# Patient Record
Sex: FEMALE | Race: WHITE | Employment: OTHER | URBAN - METROPOLITAN AREA
[De-identification: names, ages, dates, MRNs, and addresses within clinical notes are randomized per-mention and may not be internally consistent; named-entity substitution may affect disease eponyms.]

---

## 2022-01-26 DIAGNOSIS — G89.18 POST-OP PAIN: Primary | ICD-10-CM

## 2022-01-26 RX ORDER — ONDANSETRON 4 MG/1
4 TABLET, FILM COATED ORAL EVERY 8 HOURS PRN
Qty: 21 TABLET | Refills: 0 | Status: SHIPPED | OUTPATIENT
Start: 2022-01-26 | End: 2022-02-02

## 2022-01-26 RX ORDER — HYDROCODONE BITARTRATE AND ACETAMINOPHEN 5; 325 MG/1; MG/1
1 TABLET ORAL EVERY 6 HOURS PRN
Qty: 21 TABLET | Refills: 0 | Status: SHIPPED | OUTPATIENT
Start: 2022-01-26 | End: 2022-01-31

## 2023-11-22 ENCOUNTER — HOSPITAL ENCOUNTER (EMERGENCY)
Age: 73
Discharge: HOME | End: 2023-11-22
Attending: EMERGENCY MEDICINE
Payer: MEDICARE

## 2023-11-22 ENCOUNTER — APPOINTMENT (OUTPATIENT)
Dept: GENERAL RADIOLOGY | Age: 73
End: 2023-11-22
Payer: MEDICARE

## 2023-11-22 VITALS
SYSTOLIC BLOOD PRESSURE: 133 MMHG | DIASTOLIC BLOOD PRESSURE: 85 MMHG | RESPIRATION RATE: 20 BRPM | HEART RATE: 82 BPM | OXYGEN SATURATION: 100 % | TEMPERATURE: 98.4 F

## 2023-11-22 DIAGNOSIS — S63.501A SPRAIN OF RIGHT WRIST, INITIAL ENCOUNTER: Primary | ICD-10-CM

## 2023-11-22 PROCEDURE — 73110 X-RAY EXAM OF WRIST: CPT

## 2023-11-22 PROCEDURE — 99283 EMERGENCY DEPT VISIT LOW MDM: CPT

## 2023-11-22 RX ORDER — ASPIRIN 81 MG/1
81 TABLET, CHEWABLE ORAL DAILY
COMMUNITY
Start: 2022-01-27

## 2023-11-22 RX ORDER — NIFEDIPINE 90 MG/1
90 TABLET, EXTENDED RELEASE ORAL DAILY
COMMUNITY
Start: 2023-10-26

## 2023-11-22 RX ORDER — NAPROXEN 500 MG/1
500 TABLET ORAL 2 TIMES DAILY WITH MEALS
Qty: 20 TABLET | Refills: 0 | Status: SHIPPED | OUTPATIENT
Start: 2023-11-22

## 2023-11-22 NOTE — ED PROVIDER NOTES
[facility]  eMERGENCY dEPARTMENTeNCOUnter      Pt Name: Ruth Madrid  MRN: [id number]  [address] [facility] 1950  Date ofevaluation: 11/22/2023  Provider: Mahsa Suresh, [address] COMPLAINT       Chief Complaint   Patient presents with    Wrist Injury     Angel Oconnell last week. Right wrist. Slightly swollen, PMS intact         HISTORY OF PRESENT ILLNESS  (Location/Symptom, Timing/Onset, Context/Setting, Quality, Duration, Modifying Factors, Severity.)   Ruth Madrid is a 68 y.o. female who presents to the emergency department with right wrist pain and swelling status post injury occurred roughly a week ago when patient tripped and fell using her right arm to catch herself. Pain worse with movement relieved with rest.      Nursing Notes were reviewed. ALLERGIES     Patient has no known allergies. CURRENT MEDICATIONS       Discharge Medication List as of 11/22/2023 10:12 AM        CONTINUE these medications which have NOT CHANGED    Details   NIFEdipine (PROCARDIA XL) 90 MG extended release tablet Take 1 tablet by mouth dailyHistorical Med      dapagliflozin (FARXIGA) 10 MG tablet Take 1 tablet by mouth dailyHistorical Med      aspirin 81 MG chewable tablet Take 1 tablet by mouth dailyHistorical Med      hydrochlorothiazide (HYDRODIURIL) 25 MG tablet Take 25 mg by mouth daily      escitalopram (LEXAPRO) 10 MG tablet Take 10 mg by mouth daily      LORazepam (ATIVAN) 0.5 MG tablet Take 0.5 mg by mouth 2 times daily as needed for Anxiety      metoprolol succinate (TOPROL XL) 100 MG extended release tablet Take 100 mg by mouth daily      cloNIDine (CATAPRES) 0.1 MG tablet Take 0.1 mg by mouth 2 times daily             PAST MEDICAL HISTORY         Diagnosis Date    Arthritis     Brain aneurysm 4157,[id number]    Hypertension        SURGICAL HISTORY           Procedure Laterality Date    BRAIN SURGERY      clip and coil     JOINT REPLACEMENT      TUBAL LIGATION           HISTORY     History reviewed.  No pertinent

## 2023-11-22 NOTE — ED PROVIDER NOTES
eMERGENCY dEPARTMENT eNCOUnter   Independent Attestation     Pt Name: Jacobo Marin  MRN: [id number]  [address] [facility] 1950  Date of evaluation: 11/22/23     Jacobo Marin is a 68 y.o. female with CC: Wrist Injury Nickola Pals last week. Right wrist. Slightly swollen, PMS intact)        This visit was performed by both a physician and an APC. I performed all aspects of the MDM as documented.       Kelby Torrez MD  Attending Emergency Physician            Kelby Torrez MD  37/02/35 7090

## 2024-04-20 ENCOUNTER — APPOINTMENT (OUTPATIENT)
Dept: GENERAL RADIOLOGY | Age: 74
DRG: 690 | End: 2024-04-20
Payer: MEDICARE

## 2024-04-20 ENCOUNTER — HOSPITAL ENCOUNTER (INPATIENT)
Age: 74
LOS: 5 days | Discharge: HOME HEALTH | DRG: 690 | End: 2024-04-25
Attending: EMERGENCY MEDICINE | Admitting: FAMILY MEDICINE
Payer: MEDICARE

## 2024-04-20 DIAGNOSIS — I51.3 LV (LEFT VENTRICULAR) MURAL THROMBUS: ICD-10-CM

## 2024-04-20 DIAGNOSIS — I95.1 ORTHOSTATIC HYPOTENSION: ICD-10-CM

## 2024-04-20 DIAGNOSIS — N10 ACUTE PYELONEPHRITIS: Primary | ICD-10-CM

## 2024-04-20 DIAGNOSIS — M54.50 MIDLINE LOW BACK PAIN WITHOUT SCIATICA, UNSPECIFIED CHRONICITY: ICD-10-CM

## 2024-04-20 DIAGNOSIS — R42 LIGHT HEADEDNESS: ICD-10-CM

## 2024-04-20 DIAGNOSIS — R07.9 CHEST PAIN, UNSPECIFIED TYPE: ICD-10-CM

## 2024-04-20 DIAGNOSIS — R31.0 GROSS HEMATURIA: ICD-10-CM

## 2024-04-20 PROBLEM — I50.20 HEART FAILURE WITH REDUCED EJECTION FRACTION (HCC): Status: ACTIVE | Noted: 2023-03-14

## 2024-04-20 PROBLEM — K21.9 GERD (GASTROESOPHAGEAL REFLUX DISEASE): Status: ACTIVE | Noted: 2023-08-08

## 2024-04-20 PROBLEM — E78.5 DYSLIPIDEMIA: Status: ACTIVE | Noted: 2019-08-12

## 2024-04-20 PROBLEM — N30.00 ACUTE CYSTITIS WITHOUT HEMATURIA: Status: ACTIVE | Noted: 2024-04-20

## 2024-04-20 PROBLEM — I63.9 CEREBROVASCULAR ACCIDENT (CVA) (HCC): Status: ACTIVE | Noted: 2019-07-09

## 2024-04-20 PROBLEM — I25.10 CORONARY ARTERY DISEASE INVOLVING NATIVE CORONARY ARTERY OF NATIVE HEART WITHOUT ANGINA PECTORIS: Status: ACTIVE | Noted: 2019-11-07

## 2024-04-20 PROBLEM — I10 ESSENTIAL HYPERTENSION: Status: ACTIVE | Noted: 2019-07-09

## 2024-04-20 LAB
ALBUMIN SERPL-MCNC: 3.8 G/DL (ref 3.5–5.2)
ALP SERPL-CCNC: 66 U/L (ref 35–104)
ALT SERPL-CCNC: 13 U/L (ref 5–33)
ANION GAP SERPL CALCULATED.3IONS-SCNC: 11 MMOL/L (ref 9–17)
AST SERPL-CCNC: 16 U/L
BACTERIA URNS QL MICRO: ABNORMAL
BASOPHILS # BLD: <0.03 K/UL (ref 0–0.2)
BASOPHILS NFR BLD: 0 % (ref 0–2)
BILIRUB SERPL-MCNC: 0.5 MG/DL (ref 0.3–1.2)
BILIRUB UR QL STRIP: NEGATIVE
BNP SERPL-MCNC: 1942 PG/ML
BUN SERPL-MCNC: 40 MG/DL (ref 8–23)
BUN/CREAT SERPL: 33 (ref 9–20)
CALCIUM SERPL-MCNC: 9.1 MG/DL (ref 8.6–10.4)
CHLORIDE SERPL-SCNC: 101 MMOL/L (ref 98–107)
CLARITY UR: ABNORMAL
CO2 SERPL-SCNC: 22 MMOL/L (ref 20–31)
COLOR UR: YELLOW
CREAT SERPL-MCNC: 1.2 MG/DL (ref 0.5–0.9)
EKG ATRIAL RATE: 73 BPM
EKG P AXIS: 74 DEGREES
EKG P-R INTERVAL: 204 MS
EKG Q-T INTERVAL: 420 MS
EKG QRS DURATION: 82 MS
EKG QTC CALCULATION (BAZETT): 462 MS
EKG R AXIS: -18 DEGREES
EKG T AXIS: 100 DEGREES
EKG VENTRICULAR RATE: 73 BPM
EOSINOPHIL # BLD: <0.03 K/UL (ref 0–0.44)
EOSINOPHILS RELATIVE PERCENT: 0 % (ref 1–4)
EPI CELLS #/AREA URNS HPF: ABNORMAL /HPF (ref 0–5)
ERYTHROCYTE [DISTWIDTH] IN BLOOD BY AUTOMATED COUNT: 12.5 % (ref 11.8–14.4)
GFR SERPL CREATININE-BSD FRML MDRD: 48 ML/MIN/1.73M2
GLUCOSE SERPL-MCNC: 97 MG/DL (ref 70–99)
GLUCOSE UR STRIP-MCNC: ABNORMAL MG/DL
HCT VFR BLD AUTO: 36.8 % (ref 36.3–47.1)
HCT VFR BLD AUTO: 37.8 % (ref 36.3–47.1)
HGB BLD-MCNC: 11.8 G/DL (ref 11.9–15.1)
HGB BLD-MCNC: 12.3 G/DL (ref 11.9–15.1)
HGB UR QL STRIP.AUTO: ABNORMAL
IMM GRANULOCYTES # BLD AUTO: 0.06 K/UL (ref 0–0.3)
IMM GRANULOCYTES NFR BLD: 0 %
KETONES UR STRIP-MCNC: NEGATIVE MG/DL
LACTATE BLDV-SCNC: 1.3 MMOL/L (ref 0.5–2.2)
LEUKOCYTE ESTERASE UR QL STRIP: ABNORMAL
LIPASE SERPL-CCNC: 60 U/L (ref 13–60)
LYMPHOCYTES NFR BLD: 0.7 K/UL (ref 1.1–3.7)
LYMPHOCYTES RELATIVE PERCENT: 5 % (ref 24–43)
MCH RBC QN AUTO: 28.6 PG (ref 25.2–33.5)
MCHC RBC AUTO-ENTMCNC: 32.1 G/DL (ref 28.4–34.8)
MCV RBC AUTO: 89.1 FL (ref 82.6–102.9)
MONOCYTES NFR BLD: 0.39 K/UL (ref 0.1–1.2)
MONOCYTES NFR BLD: 3 % (ref 3–12)
NEUTROPHILS NFR BLD: 92 % (ref 36–65)
NEUTS SEG NFR BLD: 13.9 K/UL (ref 1.5–8.1)
NITRITE UR QL STRIP: NEGATIVE
NRBC BLD-RTO: 0 PER 100 WBC
PH UR STRIP: 7 [PH] (ref 5–8)
PLATELET # BLD AUTO: 298 K/UL (ref 138–453)
PMV BLD AUTO: 8.5 FL (ref 8.1–13.5)
POTASSIUM SERPL-SCNC: 4.4 MMOL/L (ref 3.7–5.3)
PROT SERPL-MCNC: 6.9 G/DL (ref 6.4–8.3)
PROT UR STRIP-MCNC: ABNORMAL MG/DL
RBC # BLD AUTO: 4.13 M/UL (ref 3.95–5.11)
RBC #/AREA URNS HPF: ABNORMAL /HPF (ref 0–2)
SODIUM SERPL-SCNC: 134 MMOL/L (ref 135–144)
SP GR UR STRIP: 1.01 (ref 1–1.03)
TROPONIN I SERPL HS-MCNC: 24 NG/L (ref 0–14)
TROPONIN I SERPL HS-MCNC: 28 NG/L (ref 0–14)
UROBILINOGEN UR STRIP-ACNC: NORMAL EU/DL (ref 0–1)
WBC #/AREA URNS HPF: ABNORMAL /HPF (ref 0–5)
WBC OTHER # BLD: 15.1 K/UL (ref 3.5–11.3)

## 2024-04-20 PROCEDURE — 36415 COLL VENOUS BLD VENIPUNCTURE: CPT

## 2024-04-20 PROCEDURE — 6360000002 HC RX W HCPCS: Performed by: NURSE PRACTITIONER

## 2024-04-20 PROCEDURE — 6370000000 HC RX 637 (ALT 250 FOR IP): Performed by: NURSE PRACTITIONER

## 2024-04-20 PROCEDURE — 85025 COMPLETE CBC W/AUTO DIFF WBC: CPT

## 2024-04-20 PROCEDURE — 99222 1ST HOSP IP/OBS MODERATE 55: CPT | Performed by: NURSE PRACTITIONER

## 2024-04-20 PROCEDURE — 80053 COMPREHEN METABOLIC PANEL: CPT

## 2024-04-20 PROCEDURE — 99285 EMERGENCY DEPT VISIT HI MDM: CPT

## 2024-04-20 PROCEDURE — 84484 ASSAY OF TROPONIN QUANT: CPT

## 2024-04-20 PROCEDURE — 83605 ASSAY OF LACTIC ACID: CPT

## 2024-04-20 PROCEDURE — 87086 URINE CULTURE/COLONY COUNT: CPT

## 2024-04-20 PROCEDURE — 93005 ELECTROCARDIOGRAM TRACING: CPT

## 2024-04-20 PROCEDURE — 6360000002 HC RX W HCPCS

## 2024-04-20 PROCEDURE — 87088 URINE BACTERIA CULTURE: CPT

## 2024-04-20 PROCEDURE — 96374 THER/PROPH/DIAG INJ IV PUSH: CPT

## 2024-04-20 PROCEDURE — 1200000000 HC SEMI PRIVATE

## 2024-04-20 PROCEDURE — 2580000003 HC RX 258: Performed by: NURSE PRACTITIONER

## 2024-04-20 PROCEDURE — 83880 ASSAY OF NATRIURETIC PEPTIDE: CPT

## 2024-04-20 PROCEDURE — 83690 ASSAY OF LIPASE: CPT

## 2024-04-20 PROCEDURE — 85018 HEMOGLOBIN: CPT

## 2024-04-20 PROCEDURE — 2500000003 HC RX 250 WO HCPCS: Performed by: NURSE PRACTITIONER

## 2024-04-20 PROCEDURE — 85014 HEMATOCRIT: CPT

## 2024-04-20 PROCEDURE — 87186 SC STD MICRODIL/AGAR DIL: CPT

## 2024-04-20 PROCEDURE — 2580000003 HC RX 258

## 2024-04-20 PROCEDURE — 96375 TX/PRO/DX INJ NEW DRUG ADDON: CPT

## 2024-04-20 PROCEDURE — 71045 X-RAY EXAM CHEST 1 VIEW: CPT

## 2024-04-20 PROCEDURE — 81001 URINALYSIS AUTO W/SCOPE: CPT

## 2024-04-20 PROCEDURE — 6370000000 HC RX 637 (ALT 250 FOR IP): Performed by: FAMILY MEDICINE

## 2024-04-20 RX ORDER — ASPIRIN 81 MG/1
81 TABLET, CHEWABLE ORAL DAILY
Status: DISCONTINUED | OUTPATIENT
Start: 2024-04-20 | End: 2024-04-25 | Stop reason: HOSPADM

## 2024-04-20 RX ORDER — ONDANSETRON 2 MG/ML
4 INJECTION INTRAMUSCULAR; INTRAVENOUS EVERY 6 HOURS PRN
Status: DISCONTINUED | OUTPATIENT
Start: 2024-04-20 | End: 2024-04-25 | Stop reason: HOSPADM

## 2024-04-20 RX ORDER — ONDANSETRON 2 MG/ML
4 INJECTION INTRAMUSCULAR; INTRAVENOUS ONCE
Status: COMPLETED | OUTPATIENT
Start: 2024-04-20 | End: 2024-04-20

## 2024-04-20 RX ORDER — FUROSEMIDE 10 MG/ML
40 INJECTION INTRAMUSCULAR; INTRAVENOUS ONCE
Status: COMPLETED | OUTPATIENT
Start: 2024-04-20 | End: 2024-04-20

## 2024-04-20 RX ORDER — PANTOPRAZOLE SODIUM 40 MG/1
40 TABLET, DELAYED RELEASE ORAL DAILY
COMMUNITY
End: 2024-04-20

## 2024-04-20 RX ORDER — SODIUM CHLORIDE 0.9 % (FLUSH) 0.9 %
10 SYRINGE (ML) INJECTION PRN
Status: DISCONTINUED | OUTPATIENT
Start: 2024-04-20 | End: 2024-04-25 | Stop reason: HOSPADM

## 2024-04-20 RX ORDER — SODIUM CHLORIDE 0.9 % (FLUSH) 0.9 %
5-40 SYRINGE (ML) INJECTION EVERY 12 HOURS SCHEDULED
Status: DISCONTINUED | OUTPATIENT
Start: 2024-04-20 | End: 2024-04-25 | Stop reason: HOSPADM

## 2024-04-20 RX ORDER — ATORVASTATIN CALCIUM 80 MG/1
80 TABLET, FILM COATED ORAL DAILY
Status: DISCONTINUED | OUTPATIENT
Start: 2024-04-20 | End: 2024-04-25 | Stop reason: HOSPADM

## 2024-04-20 RX ORDER — POTASSIUM CHLORIDE 20 MEQ/1
40 TABLET, EXTENDED RELEASE ORAL PRN
Status: DISCONTINUED | OUTPATIENT
Start: 2024-04-20 | End: 2024-04-25 | Stop reason: HOSPADM

## 2024-04-20 RX ORDER — POLYETHYLENE GLYCOL 3350 17 G/17G
17 POWDER, FOR SOLUTION ORAL DAILY PRN
Status: DISCONTINUED | OUTPATIENT
Start: 2024-04-20 | End: 2024-04-25 | Stop reason: HOSPADM

## 2024-04-20 RX ORDER — 0.9 % SODIUM CHLORIDE 0.9 %
1000 INTRAVENOUS SOLUTION INTRAVENOUS ONCE
Status: COMPLETED | OUTPATIENT
Start: 2024-04-20 | End: 2024-04-20

## 2024-04-20 RX ORDER — SODIUM CHLORIDE 9 MG/ML
INJECTION, SOLUTION INTRAVENOUS CONTINUOUS
Status: ACTIVE | OUTPATIENT
Start: 2024-04-20 | End: 2024-04-22

## 2024-04-20 RX ORDER — FENTANYL CITRATE 0.05 MG/ML
50 INJECTION, SOLUTION INTRAMUSCULAR; INTRAVENOUS ONCE
Status: COMPLETED | OUTPATIENT
Start: 2024-04-20 | End: 2024-04-20

## 2024-04-20 RX ORDER — ATORVASTATIN CALCIUM 80 MG/1
80 TABLET, FILM COATED ORAL DAILY
COMMUNITY

## 2024-04-20 RX ORDER — PROCHLORPERAZINE EDISYLATE 5 MG/ML
10 INJECTION INTRAMUSCULAR; INTRAVENOUS ONCE
Status: COMPLETED | OUTPATIENT
Start: 2024-04-20 | End: 2024-04-20

## 2024-04-20 RX ORDER — SODIUM CHLORIDE 9 MG/ML
INJECTION, SOLUTION INTRAVENOUS PRN
Status: DISCONTINUED | OUTPATIENT
Start: 2024-04-20 | End: 2024-04-25 | Stop reason: HOSPADM

## 2024-04-20 RX ORDER — ONDANSETRON 4 MG/1
4 TABLET, ORALLY DISINTEGRATING ORAL EVERY 8 HOURS PRN
Status: DISCONTINUED | OUTPATIENT
Start: 2024-04-20 | End: 2024-04-25 | Stop reason: HOSPADM

## 2024-04-20 RX ORDER — MAGNESIUM SULFATE 1 G/100ML
1000 INJECTION INTRAVENOUS PRN
Status: DISCONTINUED | OUTPATIENT
Start: 2024-04-20 | End: 2024-04-25 | Stop reason: HOSPADM

## 2024-04-20 RX ORDER — METOPROLOL TARTRATE 1 MG/ML
10 INJECTION, SOLUTION INTRAVENOUS EVERY 6 HOURS
Status: DISCONTINUED | OUTPATIENT
Start: 2024-04-20 | End: 2024-04-21

## 2024-04-20 RX ORDER — ENOXAPARIN SODIUM 100 MG/ML
1 INJECTION SUBCUTANEOUS 2 TIMES DAILY
Status: DISCONTINUED | OUTPATIENT
Start: 2024-04-20 | End: 2024-04-25 | Stop reason: HOSPADM

## 2024-04-20 RX ORDER — OXYCODONE HYDROCHLORIDE AND ACETAMINOPHEN 5; 325 MG/1; MG/1
1 TABLET ORAL EVERY 4 HOURS PRN
Status: DISCONTINUED | OUTPATIENT
Start: 2024-04-20 | End: 2024-04-25 | Stop reason: HOSPADM

## 2024-04-20 RX ORDER — MORPHINE SULFATE 4 MG/ML
4 INJECTION, SOLUTION INTRAMUSCULAR; INTRAVENOUS EVERY 4 HOURS PRN
Status: DISCONTINUED | OUTPATIENT
Start: 2024-04-20 | End: 2024-04-25 | Stop reason: HOSPADM

## 2024-04-20 RX ORDER — CLONIDINE HYDROCHLORIDE 0.1 MG/1
0.1 TABLET ORAL 2 TIMES DAILY
Status: DISCONTINUED | OUTPATIENT
Start: 2024-04-20 | End: 2024-04-23

## 2024-04-20 RX ORDER — NIFEDIPINE 90 MG/1
90 TABLET, EXTENDED RELEASE ORAL DAILY
Status: DISCONTINUED | OUTPATIENT
Start: 2024-04-20 | End: 2024-04-23

## 2024-04-20 RX ORDER — HYDRALAZINE HYDROCHLORIDE 50 MG/1
50 TABLET, FILM COATED ORAL EVERY 8 HOURS SCHEDULED
Status: DISCONTINUED | OUTPATIENT
Start: 2024-04-20 | End: 2024-04-25 | Stop reason: HOSPADM

## 2024-04-20 RX ORDER — POTASSIUM CHLORIDE 7.45 MG/ML
10 INJECTION INTRAVENOUS PRN
Status: DISCONTINUED | OUTPATIENT
Start: 2024-04-20 | End: 2024-04-25 | Stop reason: HOSPADM

## 2024-04-20 RX ORDER — ACETAMINOPHEN 650 MG/1
650 SUPPOSITORY RECTAL EVERY 6 HOURS PRN
Status: DISCONTINUED | OUTPATIENT
Start: 2024-04-20 | End: 2024-04-25 | Stop reason: HOSPADM

## 2024-04-20 RX ORDER — ACETAMINOPHEN 325 MG/1
650 TABLET ORAL EVERY 6 HOURS PRN
Status: DISCONTINUED | OUTPATIENT
Start: 2024-04-20 | End: 2024-04-25 | Stop reason: HOSPADM

## 2024-04-20 RX ADMIN — SACUBITRIL AND VALSARTAN 1 TABLET: 97; 103 TABLET, FILM COATED ORAL at 19:55

## 2024-04-20 RX ADMIN — FUROSEMIDE 40 MG: 10 INJECTION, SOLUTION INTRAMUSCULAR; INTRAVENOUS at 14:47

## 2024-04-20 RX ADMIN — ASPIRIN 81 MG CHEWABLE TABLET 81 MG: 81 TABLET CHEWABLE at 16:19

## 2024-04-20 RX ADMIN — WATER 1000 MG: 1 INJECTION INTRAMUSCULAR; INTRAVENOUS; SUBCUTANEOUS at 14:47

## 2024-04-20 RX ADMIN — HYDRALAZINE HYDROCHLORIDE 50 MG: 50 TABLET ORAL at 16:29

## 2024-04-20 RX ADMIN — MORPHINE SULFATE 4 MG: 4 INJECTION, SOLUTION INTRAMUSCULAR; INTRAVENOUS at 21:49

## 2024-04-20 RX ADMIN — CLONIDINE HYDROCHLORIDE 0.1 MG: 0.1 TABLET ORAL at 19:55

## 2024-04-20 RX ADMIN — METOPROLOL TARTRATE 10 MG: 5 INJECTION INTRAVENOUS at 21:49

## 2024-04-20 RX ADMIN — METOPROLOL TARTRATE 10 MG: 5 INJECTION INTRAVENOUS at 16:29

## 2024-04-20 RX ADMIN — ONDANSETRON 4 MG: 2 INJECTION INTRAMUSCULAR; INTRAVENOUS at 12:11

## 2024-04-20 RX ADMIN — NIFEDIPINE 90 MG: 90 TABLET, FILM COATED, EXTENDED RELEASE ORAL at 16:20

## 2024-04-20 RX ADMIN — SODIUM CHLORIDE 1000 ML: 9 INJECTION, SOLUTION INTRAVENOUS at 12:39

## 2024-04-20 RX ADMIN — OXYCODONE HYDROCHLORIDE AND ACETAMINOPHEN 1 TABLET: 5; 325 TABLET ORAL at 19:55

## 2024-04-20 RX ADMIN — FENTANYL CITRATE 50 MCG: 0.05 INJECTION, SOLUTION INTRAMUSCULAR; INTRAVENOUS at 12:11

## 2024-04-20 RX ADMIN — SODIUM CHLORIDE: 9 INJECTION, SOLUTION INTRAVENOUS at 16:49

## 2024-04-20 RX ADMIN — EMPAGLIFLOZIN 10 MG: 10 TABLET, FILM COATED ORAL at 16:19

## 2024-04-20 RX ADMIN — HYDRALAZINE HYDROCHLORIDE 50 MG: 50 TABLET ORAL at 21:49

## 2024-04-20 RX ADMIN — ENOXAPARIN SODIUM 60 MG: 100 INJECTION SUBCUTANEOUS at 16:19

## 2024-04-20 RX ADMIN — PROCHLORPERAZINE EDISYLATE 10 MG: 5 INJECTION INTRAMUSCULAR; INTRAVENOUS at 20:49

## 2024-04-20 RX ADMIN — ONDANSETRON 4 MG: 2 INJECTION INTRAMUSCULAR; INTRAVENOUS at 16:49

## 2024-04-20 RX ADMIN — SODIUM CHLORIDE, PRESERVATIVE FREE 10 ML: 5 INJECTION INTRAVENOUS at 20:49

## 2024-04-20 RX ADMIN — MORPHINE SULFATE 4 MG: 4 INJECTION, SOLUTION INTRAMUSCULAR; INTRAVENOUS at 16:56

## 2024-04-20 RX ADMIN — ATORVASTATIN CALCIUM 80 MG: 80 TABLET, FILM COATED ORAL at 16:19

## 2024-04-20 ASSESSMENT — PAIN DESCRIPTION - PAIN TYPE
TYPE: ACUTE PAIN
TYPE: ACUTE PAIN

## 2024-04-20 ASSESSMENT — PAIN DESCRIPTION - LOCATION
LOCATION: BACK;FLANK
LOCATION: ABDOMEN
LOCATION: PELVIS

## 2024-04-20 ASSESSMENT — PAIN SCALES - GENERAL
PAINLEVEL_OUTOF10: 6
PAINLEVEL_OUTOF10: 10

## 2024-04-20 ASSESSMENT — PAIN DESCRIPTION - DESCRIPTORS
DESCRIPTORS: SHARP;TIGHTNESS;DISCOMFORT
DESCRIPTORS: ACHING
DESCRIPTORS: ACHING

## 2024-04-20 ASSESSMENT — PAIN - FUNCTIONAL ASSESSMENT
PAIN_FUNCTIONAL_ASSESSMENT: PREVENTS OR INTERFERES SOME ACTIVE ACTIVITIES AND ADLS
PAIN_FUNCTIONAL_ASSESSMENT: PREVENTS OR INTERFERES SOME ACTIVE ACTIVITIES AND ADLS

## 2024-04-20 ASSESSMENT — PAIN DESCRIPTION - ONSET
ONSET: SUDDEN
ONSET: PROGRESSIVE

## 2024-04-20 ASSESSMENT — PAIN DESCRIPTION - FREQUENCY
FREQUENCY: INTERMITTENT
FREQUENCY: INTERMITTENT

## 2024-04-20 ASSESSMENT — PAIN DESCRIPTION - ORIENTATION
ORIENTATION: MID;LOWER
ORIENTATION: MID

## 2024-04-20 NOTE — PLAN OF CARE
Problem: Discharge Planning  Goal: Discharge to home or other facility with appropriate resources  Outcome: Progressing     Problem: Safety - Adult  Goal: Free from fall injury  Outcome: Progressing     Problem: Chronic Conditions and Co-morbidities  Goal: Patient's chronic conditions and co-morbidity symptoms are monitored and maintained or improved  Outcome: Progressing     Problem: Skin/Tissue Integrity - Adult  Goal: Skin integrity remains intact  Outcome: Progressing     Problem: Musculoskeletal - Adult  Goal: Return mobility to safest level of function  Outcome: Progressing     Problem: Hematologic - Adult  Goal: Maintains hematologic stability  Outcome: Progressing

## 2024-04-20 NOTE — H&P
Result Value Ref Range    WBC 15.1 (H) 3.5 - 11.3 k/uL    RBC 4.13 3.95 - 5.11 m/uL    Hemoglobin 11.8 (L) 11.9 - 15.1 g/dL    Hematocrit 36.8 36.3 - 47.1 %    MCV 89.1 82.6 - 102.9 fL    MCH 28.6 25.2 - 33.5 pg    MCHC 32.1 28.4 - 34.8 g/dL    RDW 12.5 11.8 - 14.4 %    Platelets 298 138 - 453 k/uL    MPV 8.5 8.1 - 13.5 fL    NRBC Automated 0.0 0.0 per 100 WBC    Neutrophils % 92 (H) 36 - 65 %    Lymphocytes % 5 (L) 24 - 43 %    Monocytes % 3 3 - 12 %    Eosinophils % 0 (L) 1 - 4 %    Basophils % 0 0 - 2 %    Immature Granulocytes % 0 0 %    Neutrophils Absolute 13.90 (H) 1.50 - 8.10 k/uL    Lymphocytes Absolute 0.70 (L) 1.10 - 3.70 k/uL    Monocytes Absolute 0.39 0.10 - 1.20 k/uL    Eosinophils Absolute <0.03 0.00 - 0.44 k/uL    Basophils Absolute <0.03 0.00 - 0.20 k/uL    Immature Granulocytes Absolute 0.06 0.00 - 0.30 k/uL   CMP    Collection Time: 04/20/24 12:00 PM   Result Value Ref Range    Sodium 134 (L) 135 - 144 mmol/L    Potassium 4.4 3.7 - 5.3 mmol/L    Chloride 101 98 - 107 mmol/L    CO2 22 20 - 31 mmol/L    Anion Gap 11 9 - 17 mmol/L    Glucose 97 70 - 99 mg/dL    BUN 40 (H) 8 - 23 mg/dL    Creatinine 1.2 (H) 0.5 - 0.9 mg/dL    Est, Glom Filt Rate 48 (L) >60 mL/min/1.73m2    Bun/Cre Ratio 33 (H) 9 - 20    Calcium 9.1 8.6 - 10.4 mg/dL    Total Protein 6.9 6.4 - 8.3 g/dL    Albumin 3.8 3.5 - 5.2 g/dL    Total Bilirubin 0.5 0.3 - 1.2 mg/dL    Alkaline Phosphatase 66 35 - 104 U/L    ALT 13 5 - 33 U/L    AST 16 <32 U/L   Lipase    Collection Time: 04/20/24 12:00 PM   Result Value Ref Range    Lipase 60 13 - 60 U/L   Lactic Acid    Collection Time: 04/20/24 12:00 PM   Result Value Ref Range    Lactic Acid 1.3 0.5 - 2.2 mmol/L   Troponin    Collection Time: 04/20/24 12:00 PM   Result Value Ref Range    Troponin, High Sensitivity 28 (H) 0 - 14 ng/L   Troponin    Collection Time: 04/20/24  1:06 PM   Result Value Ref Range    Troponin, High Sensitivity 24 (H) 0 - 14 ng/L   Brain Natriuretic Peptide

## 2024-04-20 NOTE — ED PROVIDER NOTES
EMERGENCY DEPARTMENT ENCOUNTER   ATTENDING ATTESTATION     Pt Name: Angelica Harvey  MRN: [id number]  Birthdate 1950  Date of evaluation: 4/20/24   Angelica Harvey is a 73 y.o. female with CC: Chest Pain (PT states she has been having lower back pain and chest pain for a week. ), Back Pain, and Flank Pain    MDM:   I performed a substantive part of the MDM during the patient's E/M visit. I personally evaluated and examined the patient. I personally made or approved the documented management plan and acknowledge its risk of complications.    Independent Interpretation: My (EKG/X-Ray/US/CT) interpretation     Discussion: Management/test interpretation discussed with   ED Course as of 04/20/24 1613   Sat Apr 20, 2024   1233 Lipase: 60 [AJ]   1233 Lactic Acid: 1.3 [AJ]   1233 Sodium(!): 134 [AJ]   1233 Potassium: 4.4 [AJ]   1233 Chloride: 101 [AJ]   1233 WBC(!): 15.1 [AJ]   1233 RBC: 4.13 [AJ]   1233 Hemoglobin Quant(!): 11.8 [AJ]   1233 Hematocrit: 36.8 [AJ]   1234 XR CHEST PORTABLE  Re demonstration of AAA noted on CT scan at [facility] on 4/18/24. No focal consolidation or opacities. [AJ]   1256 Troponin, High Sensitivity(!): 28  Repeat troponin ordered. [AJ]   1340 Pro-BNP(!): 1,942  40 mg lasix ordered. Patient with known history of CHF. [AJ]   1341 Troponin, High Sensitivity(!): 24  Troponin trending down. [AJ]   1341 Admission pending UA. [AJ]   1410 Urinalysis with Microscopic(!):    Color, UA Yellow   Turbidity UA Cloudy(!)   Glucose, UA 1+(!)   Bilirubin, Urine NEGATIVE   Ketones, Urine NEGATIVE   Specific [place], UA 1.015   Urine Hgb 1+(!)   pH, UA 7.0   Protein, UA TRACE(!)   Urobilinogen, Urine Normal   Nitrite, Urine NEGATIVE   Leukocyte Esterase, Urine LARGE(!)   WBC, UA 50    RBC, UA 10 TO 20   Epithelial Cells, UA 0 TO 2   Bacteria, UA MANY(!)  1 g rocephin ordered. [AJ]   1415 I discussed urinalysis and blood work results with patient and son at bedside.  Recommended hospitalization for IV 
Status: She is alert and oriented to person, place, and time.      Cranial Nerves: No cranial nerve deficit.      Sensory: No sensory deficit.      Motor: No weakness.      Gait: Gait normal.         DIAGNOSTIC RESULTS     EKG: All EKG's are interpreted by the Emergency Department Physician who either signs or Co-signs this chart in the absence of a cardiologist.  EKG was interpreted by Dr. Aaron after completion.      RADIOLOGY:   Non-plain film images such as CT, Ultrasound and MRI are read by the radiologist. Plain radiographic images are visualized and preliminarily interpreted by the emergency physician with the below findings:    Interpretation per the Radiologist below, if available at the time of this note:    XR CHEST PORTABLE    Result Date: 4/20/2024  EXAMINATION: ONE XRAY VIEW OF THE CHEST 4/20/2024 12:04 pm COMPARISON: January 10, 2017; December 31, 2007. HISTORY: ORDERING SYSTEM PROVIDED HISTORY: cardiac work-up TECHNOLOGIST PROVIDED HISTORY: cardiac work-up Reason for Exam: port ap upright chest pain FINDINGS: Widening of the mediastinum, new since prior exam.  Cardiac silhouette is within normal range.  Lungs are clear.  No focal consolidation, pleural effusion, or pneumothorax.     1. Widening of the upper mediastinum.  Differential includes ascending thoracic aortic aneurysm and aortic tortuosity.  Recommend CT of the chest with contrast, if there is no contraindication, for further evaluation. 2. No acute pulmonary abnormality.     CT chest with contrast    Result Date: 4/18/2024  PROCEDURE: CT CHEST WITH CONTRAST CLINICAL INDICATION: . Respiratory illness, nondiagnostic xray. COMPARISON: None TECHNIQUE: CT was performed of the chest using 100 mL Omnipaque 300 intravenous contrast, without complication. Coronal & sagittal MPR images were generated and reviewed. FINDINGS: No acute findings at the thoracic inlet, visualized body wall, upper abdomen.  No aggressive osseous lesions fusiform

## 2024-04-20 NOTE — ED NOTES
ED to inpatient nurses report     Chief Complaint   Patient presents with    Chest Pain     PT states she has been having lower back pain and chest pain for a week.     Back Pain    Flank Pain      Present to ED from home. Pt has been having difficulty ambulating due to pain.   LOC: alert and orientated to name, place, date  Vital signs   Vitals:    04/20/24 1134 04/20/24 1230 04/20/24 1313   BP: (!) 151/93 (!) 151/89    Pulse: 77 73 77   Resp: 18 19 13   Temp: 98.6 °F (37 °C)     TempSrc: Oral     SpO2: 93% 100% 99%   Weight: 63.5 kg (140 lb)     Height: 1.753 m (5' 9\")        Oxygen Baseline room air    Current needs required n/a   SEPSIS: no  [no] Lactate X 2 ordered (Yes or No)  [no] Antibiotics given (Yes or No)  [no] IV Fluids ordered (Yes or No)             [no] 2nd IV completed (Yes or No)  [no] Hourly Vital Signs (Validated)  [no] Outstanding Orders:     LDAs:   Peripheral IV 04/20/24 Right Antecubital (Active)     Mobility: 2+ assist   Fall Risk:    Pending ED orders: see orders  Present condition: stable  Code Status: full  Consults: IP CONSULT TO INTERNAL MEDICINE  [x]  Hospitalist  Completed  [x] yes [] no Who: intermed  []  Medicine  Completed  [] yes [] No Who:   []  Cardiology  Completed  [] yes [] No Who:   []  GI   Completed  [] yes [] No Who:   []  Neurology  Completed  [] yes [] No Who:   []  Nephrology Completed  [] yes [] No Who:    []  Vascular  Completed  [] yes [] No Who:   []  Ortho  Completed  [] yes [] No Who:     []  Surgery  Completed  [] yes [] No Who:    []  Urology  Completed  [] yes [] No Who:    []  CT Surgery Completed  [] yes [] No Who:   []  Podiatry  Completed  [] yes [] No Who:    []  Other    Completed  [] yes [] No Who:  Interventions: IV, labs, straight cath   Important Events: none        Electronically signed by Cady Owens RN on 4/20/2024 at 2:17 PM

## 2024-04-21 ENCOUNTER — APPOINTMENT (OUTPATIENT)
Dept: CT IMAGING | Age: 74
DRG: 690 | End: 2024-04-21
Payer: MEDICARE

## 2024-04-21 PROBLEM — R31.0 GROSS HEMATURIA: Status: ACTIVE | Noted: 2024-04-21

## 2024-04-21 PROBLEM — N13.30 BILATERAL HYDRONEPHROSIS: Status: ACTIVE | Noted: 2024-04-21

## 2024-04-21 PROBLEM — R33.9 URINARY RETENTION: Status: ACTIVE | Noted: 2024-04-21

## 2024-04-21 LAB
ANION GAP SERPL CALCULATED.3IONS-SCNC: 9 MMOL/L (ref 9–17)
BASOPHILS # BLD: 0 K/UL (ref 0–0.2)
BASOPHILS NFR BLD: 0 % (ref 0–2)
BUN SERPL-MCNC: 35 MG/DL (ref 8–23)
BUN/CREAT SERPL: 27 (ref 9–20)
CALCIUM SERPL-MCNC: 8.8 MG/DL (ref 8.6–10.4)
CHLORIDE SERPL-SCNC: 98 MMOL/L (ref 98–107)
CO2 SERPL-SCNC: 26 MMOL/L (ref 20–31)
CREAT SERPL-MCNC: 1.3 MG/DL (ref 0.5–0.9)
EOSINOPHIL # BLD: 0 K/UL (ref 0–0.44)
EOSINOPHILS RELATIVE PERCENT: 0 % (ref 1–4)
ERYTHROCYTE [DISTWIDTH] IN BLOOD BY AUTOMATED COUNT: 12.6 % (ref 11.8–14.4)
GFR SERPL CREATININE-BSD FRML MDRD: 44 ML/MIN/1.73M2
GLUCOSE SERPL-MCNC: 145 MG/DL (ref 70–99)
HCT VFR BLD AUTO: 30.9 % (ref 36.3–47.1)
HCT VFR BLD AUTO: 37.2 % (ref 36.3–47.1)
HGB BLD-MCNC: 11.8 G/DL (ref 11.9–15.1)
HGB BLD-MCNC: 9.7 G/DL (ref 11.9–15.1)
IMM GRANULOCYTES # BLD AUTO: 0.37 K/UL (ref 0–0.3)
IMM GRANULOCYTES NFR BLD: 1 %
LACTATE BLDV-SCNC: 2.1 MMOL/L (ref 0.5–2.2)
LYMPHOCYTES NFR BLD: 0.73 K/UL (ref 1.1–3.7)
LYMPHOCYTES RELATIVE PERCENT: 2 % (ref 24–43)
MCH RBC QN AUTO: 28.8 PG (ref 25.2–33.5)
MCHC RBC AUTO-ENTMCNC: 31.7 G/DL (ref 28.4–34.8)
MCV RBC AUTO: 90.7 FL (ref 82.6–102.9)
MICROORGANISM SPEC CULT: ABNORMAL
MONOCYTES NFR BLD: 1.83 K/UL (ref 0.1–1.2)
MONOCYTES NFR BLD: 5 % (ref 3–12)
NEUTROPHILS NFR BLD: 92 % (ref 36–65)
NEUTS SEG NFR BLD: 33.57 K/UL (ref 1.5–8.1)
NRBC BLD-RTO: 0 PER 100 WBC
PLATELET # BLD AUTO: 363 K/UL (ref 138–453)
PMV BLD AUTO: 8.7 FL (ref 8.1–13.5)
POTASSIUM SERPL-SCNC: 4.8 MMOL/L (ref 3.7–5.3)
PROCALCITONIN SERPL-MCNC: 0.31 NG/ML (ref 0–0.09)
RBC # BLD AUTO: 4.1 M/UL (ref 3.95–5.11)
SODIUM SERPL-SCNC: 133 MMOL/L (ref 135–144)
SPECIMEN DESCRIPTION: ABNORMAL
WBC OTHER # BLD: 36.5 K/UL (ref 3.5–11.3)

## 2024-04-21 PROCEDURE — 2580000003 HC RX 258: Performed by: NURSE PRACTITIONER

## 2024-04-21 PROCEDURE — 6370000000 HC RX 637 (ALT 250 FOR IP): Performed by: NURSE PRACTITIONER

## 2024-04-21 PROCEDURE — 84145 PROCALCITONIN (PCT): CPT

## 2024-04-21 PROCEDURE — 85018 HEMOGLOBIN: CPT

## 2024-04-21 PROCEDURE — 85014 HEMATOCRIT: CPT

## 2024-04-21 PROCEDURE — 2500000003 HC RX 250 WO HCPCS: Performed by: NURSE PRACTITIONER

## 2024-04-21 PROCEDURE — 99232 SBSQ HOSP IP/OBS MODERATE 35: CPT | Performed by: NURSE PRACTITIONER

## 2024-04-21 PROCEDURE — 74176 CT ABD & PELVIS W/O CONTRAST: CPT

## 2024-04-21 PROCEDURE — 6360000004 HC RX CONTRAST MEDICATION: Performed by: NURSE PRACTITIONER

## 2024-04-21 PROCEDURE — 51700 IRRIGATION OF BLADDER: CPT

## 2024-04-21 PROCEDURE — 51700 IRRIGATION OF BLADDER: CPT | Performed by: SPECIALIST

## 2024-04-21 PROCEDURE — 85025 COMPLETE CBC W/AUTO DIFF WBC: CPT

## 2024-04-21 PROCEDURE — 80048 BASIC METABOLIC PNL TOTAL CA: CPT

## 2024-04-21 PROCEDURE — 2060000000 HC ICU INTERMEDIATE R&B

## 2024-04-21 PROCEDURE — 99222 1ST HOSP IP/OBS MODERATE 55: CPT | Performed by: SPECIALIST

## 2024-04-21 PROCEDURE — 74178 CT ABD&PLV WO CNTR FLWD CNTR: CPT | Performed by: NURSE PRACTITIONER

## 2024-04-21 PROCEDURE — 83605 ASSAY OF LACTIC ACID: CPT

## 2024-04-21 PROCEDURE — 6360000002 HC RX W HCPCS: Performed by: NURSE PRACTITIONER

## 2024-04-21 PROCEDURE — 36415 COLL VENOUS BLD VENIPUNCTURE: CPT

## 2024-04-21 RX ORDER — CALCIUM CARBONATE 500 MG/1
500 TABLET, CHEWABLE ORAL 3 TIMES DAILY PRN
Status: DISCONTINUED | OUTPATIENT
Start: 2024-04-21 | End: 2024-04-25 | Stop reason: HOSPADM

## 2024-04-21 RX ORDER — 0.9 % SODIUM CHLORIDE 0.9 %
100 INTRAVENOUS SOLUTION INTRAVENOUS ONCE
Status: COMPLETED | OUTPATIENT
Start: 2024-04-21 | End: 2024-04-21

## 2024-04-21 RX ORDER — SODIUM CHLORIDE 0.9 % (FLUSH) 0.9 %
10 SYRINGE (ML) INJECTION PRN
Status: DISCONTINUED | OUTPATIENT
Start: 2024-04-21 | End: 2024-04-25 | Stop reason: HOSPADM

## 2024-04-21 RX ADMIN — SACUBITRIL AND VALSARTAN 1 TABLET: 97; 103 TABLET, FILM COATED ORAL at 10:41

## 2024-04-21 RX ADMIN — EMPAGLIFLOZIN 10 MG: 10 TABLET, FILM COATED ORAL at 10:40

## 2024-04-21 RX ADMIN — SODIUM CHLORIDE: 9 INJECTION, SOLUTION INTRAVENOUS at 10:37

## 2024-04-21 RX ADMIN — HYDRALAZINE HYDROCHLORIDE 50 MG: 50 TABLET ORAL at 20:11

## 2024-04-21 RX ADMIN — SODIUM CHLORIDE 100 ML: 9 INJECTION, SOLUTION INTRAVENOUS at 14:38

## 2024-04-21 RX ADMIN — WATER 1000 MG: 1 INJECTION INTRAMUSCULAR; INTRAVENOUS; SUBCUTANEOUS at 14:56

## 2024-04-21 RX ADMIN — SODIUM CHLORIDE, PRESERVATIVE FREE 10 ML: 5 INJECTION INTRAVENOUS at 14:37

## 2024-04-21 RX ADMIN — ATORVASTATIN CALCIUM 80 MG: 80 TABLET, FILM COATED ORAL at 10:41

## 2024-04-21 RX ADMIN — METOPROLOL TARTRATE 10 MG: 5 INJECTION INTRAVENOUS at 04:44

## 2024-04-21 RX ADMIN — SACUBITRIL AND VALSARTAN 1 TABLET: 97; 103 TABLET, FILM COATED ORAL at 19:55

## 2024-04-21 RX ADMIN — Medication 500 MG: at 20:11

## 2024-04-21 RX ADMIN — IOPAMIDOL 120 ML: 755 INJECTION, SOLUTION INTRAVENOUS at 14:37

## 2024-04-21 ASSESSMENT — PAIN SCALES - GENERAL: PAINLEVEL_OUTOF10: 5

## 2024-04-21 NOTE — PLAN OF CARE
Problem: Discharge Planning  Goal: Discharge to home or other facility with appropriate resources  4/21/2024 0648 by Faviola Leyva RN  Outcome: Progressing     Problem: Safety - Adult  Goal: Free from fall injury  4/21/2024 0648 by Faviola Leyva RN  Outcome: Progressing     Problem: Chronic Conditions and Co-morbidities  Goal: Patient's chronic conditions and co-morbidity symptoms are monitored and maintained or improved  4/21/2024 0648 by Faviola Leyva RN  Outcome: Progressing     Problem: Skin/Tissue Integrity - Adult  Goal: Skin integrity remains intact  4/21/2024 0648 by Faviola Leyva RN  Outcome: Progressing     Problem: Musculoskeletal - Adult  Goal: Return mobility to safest level of function  4/21/2024 0648 by Faviola Leyva RN  Outcome: Progressing     Problem: Hematologic - Adult  Goal: Maintains hematologic stability  4/21/2024 0648 by Faviola Leyva RN  Outcome: Progressing     Problem: Pain  Goal: Verbalizes/displays adequate comfort level or baseline comfort level  Outcome: Progressing

## 2024-04-21 NOTE — PLAN OF CARE
Problem: Discharge Planning  Goal: Discharge to home or other facility with appropriate resources  4/21/2024 1753 by Eboni Cordova RN  Outcome: Progressing  4/21/2024 0648 by Faviola Leyva RN  Outcome: Progressing     Problem: Safety - Adult  Goal: Free from fall injury  4/21/2024 1753 by Eboni Cordova RN  Outcome: Progressing  4/21/2024 0648 by Faviola Leyva RN  Outcome: Progressing     Problem: Chronic Conditions and Co-morbidities  Goal: Patient's chronic conditions and co-morbidity symptoms are monitored and maintained or improved  4/21/2024 1753 by Eboni Cordova RN  Outcome: Progressing  4/21/2024 0648 by Faviola Leyva RN  Outcome: Progressing     Problem: Skin/Tissue Integrity - Adult  Goal: Skin integrity remains intact  4/21/2024 1753 by Eboni Cordova RN  Outcome: Progressing  4/21/2024 0648 by Faviola Leyva RN  Outcome: Progressing     Problem: Musculoskeletal - Adult  Goal: Return mobility to safest level of function  4/21/2024 1753 by Eboni Cordova RN  Outcome: Progressing  4/21/2024 0648 by Faviola Leyva RN  Outcome: Progressing     Problem: Hematologic - Adult  Goal: Maintains hematologic stability  4/21/2024 1753 by Eboni Cordova RN  Outcome: Progressing  4/21/2024 0648 by Faviola Leyva RN  Outcome: Progressing     Problem: Pain  Goal: Verbalizes/displays adequate comfort level or baseline comfort level  4/21/2024 1753 by Eboni Cordova RN  Outcome: Progressing  4/21/2024 0648 by Faviola Leyva RN  Outcome: Progressing

## 2024-04-21 NOTE — CARE COORDINATION
Case Management Assessment  Initial Evaluation    Date/Time of Evaluation: 4/21/2024 11:29 AM  Assessment Completed by: Gisell Medina RN    If patient is discharged prior to next notation, then this note serves as note for discharge by case management.    Patient Name: Angelica Harvey                   YOB: 1950  Diagnosis: Acute pyelonephritis [N10]  Light headedness [R42]  Acute cystitis without hematuria [N30.00]  Chest pain, unspecified type [R07.9]                   Date / Time: 4/20/2024 11:43 AM    Patient Admission Status: Inpatient   Readmission Risk (Low < 19, Mod (19-27), High > 27): No data recorded  Current PCP: Ciro Burns PA-C  PCP verified by CM? Yes    Chart Reviewed: Yes      History Provided by: Patient  Patient Orientation: Alert and Oriented    Patient Cognition: Alert    Hospitalization in the last 30 days (Readmission):  No    If yes, Readmission Assessment in CM Navigator will be completed.    Advance Directives:      Code Status: Full Code   Patient's Primary Decision Maker is: Legal Next of Kin      Discharge Planning:    Patient lives with: Spouse/Significant Other Type of Home: House  Primary Care Giver: Self  Patient Support Systems include: Children, Family Members, Spouse/Significant Other   Current Financial resources: Medicare  Current community resources:    Current services prior to admission: None            Current DME:              Type of Home Care services:  None    ADLS  Prior functional level: Independent in ADLs/IADLs  Current functional level: Other (see comment) (Await PT/OT eval)    PT AM-PAC:   /24  OT AM-PAC:   /24    Family can provide assistance at DC: Yes  Would you like Case Management to discuss the discharge plan with any other family members/significant others, and if so, who?    Plans to Return to Present Housing: Unknown at present  Other Identified Issues/Barriers to RETURNING to current housing: clinical status  Potential Assistance needed

## 2024-04-22 ENCOUNTER — APPOINTMENT (OUTPATIENT)
Age: 74
DRG: 690 | End: 2024-04-22
Payer: MEDICARE

## 2024-04-22 ENCOUNTER — APPOINTMENT (OUTPATIENT)
Dept: CT IMAGING | Age: 74
DRG: 690 | End: 2024-04-22
Payer: MEDICARE

## 2024-04-22 PROBLEM — N10 ACUTE PYELONEPHRITIS: Status: ACTIVE | Noted: 2024-04-22

## 2024-04-22 PROBLEM — Z86.74 HX OF CARDIAC ARREST: Status: ACTIVE | Noted: 2024-04-22

## 2024-04-22 PROBLEM — R42 LIGHT HEADEDNESS: Status: ACTIVE | Noted: 2024-04-22

## 2024-04-22 PROBLEM — R07.9 CHEST PAIN: Status: ACTIVE | Noted: 2024-04-22

## 2024-04-22 PROBLEM — Z86.73 HX OF TIA (TRANSIENT ISCHEMIC ATTACK) AND STROKE: Status: ACTIVE | Noted: 2024-04-22

## 2024-04-22 LAB
ANION GAP SERPL CALCULATED.3IONS-SCNC: 8 MMOL/L (ref 9–17)
BASOPHILS # BLD: 0 K/UL (ref 0–0.2)
BASOPHILS NFR BLD: 0 %
BUN SERPL-MCNC: 26 MG/DL (ref 8–23)
BUN/CREAT SERPL: 22 (ref 9–20)
CALCIUM SERPL-MCNC: 8 MG/DL (ref 8.6–10.4)
CHLORIDE SERPL-SCNC: 105 MMOL/L (ref 98–107)
CO2 SERPL-SCNC: 21 MMOL/L (ref 20–31)
CREAT SERPL-MCNC: 1.2 MG/DL (ref 0.5–0.9)
ECHO BSA: 1.76 M2
EOSINOPHIL # BLD: 0.49 K/UL (ref 0–0.4)
EOSINOPHILS RELATIVE PERCENT: 3 % (ref 1–4)
ERYTHROCYTE [DISTWIDTH] IN BLOOD BY AUTOMATED COUNT: 12.7 % (ref 11.8–14.4)
GFR SERPL CREATININE-BSD FRML MDRD: 48 ML/MIN/1.73M2
GLUCOSE SERPL-MCNC: 100 MG/DL (ref 70–99)
HCT VFR BLD AUTO: 29.6 % (ref 36.3–47.1)
HCT VFR BLD AUTO: 29.9 % (ref 36.3–47.1)
HCT VFR BLD AUTO: 30.7 % (ref 36.3–47.1)
HGB BLD-MCNC: 9.1 G/DL (ref 11.9–15.1)
HGB BLD-MCNC: 9.3 G/DL (ref 11.9–15.1)
HGB BLD-MCNC: 9.4 G/DL (ref 11.9–15.1)
IMM GRANULOCYTES # BLD AUTO: 0.16 K/UL (ref 0–0.3)
IMM GRANULOCYTES NFR BLD: 1 %
LYMPHOCYTES NFR BLD: 2.45 K/UL (ref 1–4.8)
LYMPHOCYTES RELATIVE PERCENT: 15 % (ref 24–44)
MCH RBC QN AUTO: 28.1 PG (ref 25.2–33.5)
MCHC RBC AUTO-ENTMCNC: 30.6 G/DL (ref 28.4–34.8)
MCV RBC AUTO: 91.9 FL (ref 82.6–102.9)
MONOCYTES NFR BLD: 1.63 K/UL (ref 0.2–0.8)
MONOCYTES NFR BLD: 10 % (ref 1–7)
NEUTROPHILS NFR BLD: 71 % (ref 36–66)
NEUTS SEG NFR BLD: 11.57 K/UL (ref 1.8–7.7)
NRBC BLD-RTO: 0 PER 100 WBC
PLATELET # BLD AUTO: 277 K/UL (ref 138–453)
PMV BLD AUTO: 8.9 FL (ref 8.1–13.5)
POTASSIUM SERPL-SCNC: 3.9 MMOL/L (ref 3.7–5.3)
RBC # BLD AUTO: 3.34 M/UL (ref 3.95–5.11)
SODIUM SERPL-SCNC: 134 MMOL/L (ref 135–144)
WBC OTHER # BLD: 16.3 K/UL (ref 3.5–11.3)

## 2024-04-22 PROCEDURE — 97116 GAIT TRAINING THERAPY: CPT

## 2024-04-22 PROCEDURE — 99222 1ST HOSP IP/OBS MODERATE 55: CPT | Performed by: PSYCHIATRY & NEUROLOGY

## 2024-04-22 PROCEDURE — 2580000003 HC RX 258: Performed by: FAMILY MEDICINE

## 2024-04-22 PROCEDURE — 71275 CT ANGIOGRAPHY CHEST: CPT

## 2024-04-22 PROCEDURE — 2580000003 HC RX 258: Performed by: NURSE PRACTITIONER

## 2024-04-22 PROCEDURE — 97530 THERAPEUTIC ACTIVITIES: CPT

## 2024-04-22 PROCEDURE — 85025 COMPLETE CBC W/AUTO DIFF WBC: CPT

## 2024-04-22 PROCEDURE — 6370000000 HC RX 637 (ALT 250 FOR IP): Performed by: NURSE PRACTITIONER

## 2024-04-22 PROCEDURE — 85014 HEMATOCRIT: CPT

## 2024-04-22 PROCEDURE — 80048 BASIC METABOLIC PNL TOTAL CA: CPT

## 2024-04-22 PROCEDURE — 97110 THERAPEUTIC EXERCISES: CPT

## 2024-04-22 PROCEDURE — 93308 TTE F-UP OR LMTD: CPT

## 2024-04-22 PROCEDURE — 6360000002 HC RX W HCPCS: Performed by: NURSE PRACTITIONER

## 2024-04-22 PROCEDURE — 97167 OT EVAL HIGH COMPLEX 60 MIN: CPT

## 2024-04-22 PROCEDURE — 51700 IRRIGATION OF BLADDER: CPT

## 2024-04-22 PROCEDURE — 2060000000 HC ICU INTERMEDIATE R&B

## 2024-04-22 PROCEDURE — 6360000004 HC RX CONTRAST MEDICATION: Performed by: FAMILY MEDICINE

## 2024-04-22 PROCEDURE — 6360000002 HC RX W HCPCS: Performed by: FAMILY MEDICINE

## 2024-04-22 PROCEDURE — 85018 HEMOGLOBIN: CPT

## 2024-04-22 PROCEDURE — 97535 SELF CARE MNGMENT TRAINING: CPT

## 2024-04-22 PROCEDURE — 97163 PT EVAL HIGH COMPLEX 45 MIN: CPT

## 2024-04-22 PROCEDURE — 97112 NEUROMUSCULAR REEDUCATION: CPT

## 2024-04-22 PROCEDURE — 36415 COLL VENOUS BLD VENIPUNCTURE: CPT

## 2024-04-22 PROCEDURE — 6370000000 HC RX 637 (ALT 250 FOR IP): Performed by: FAMILY MEDICINE

## 2024-04-22 PROCEDURE — 99232 SBSQ HOSP IP/OBS MODERATE 35: CPT | Performed by: FAMILY MEDICINE

## 2024-04-22 RX ORDER — PROCHLORPERAZINE EDISYLATE 5 MG/ML
10 INJECTION INTRAMUSCULAR; INTRAVENOUS EVERY 6 HOURS PRN
Status: DISCONTINUED | OUTPATIENT
Start: 2024-04-22 | End: 2024-04-25 | Stop reason: HOSPADM

## 2024-04-22 RX ORDER — 0.9 % SODIUM CHLORIDE 0.9 %
100 INTRAVENOUS SOLUTION INTRAVENOUS ONCE
Status: COMPLETED | OUTPATIENT
Start: 2024-04-22 | End: 2024-04-22

## 2024-04-22 RX ORDER — SODIUM CHLORIDE 0.9 % (FLUSH) 0.9 %
10 SYRINGE (ML) INJECTION PRN
Status: DISCONTINUED | OUTPATIENT
Start: 2024-04-22 | End: 2024-04-25 | Stop reason: HOSPADM

## 2024-04-22 RX ADMIN — ONDANSETRON 4 MG: 2 INJECTION INTRAMUSCULAR; INTRAVENOUS at 13:55

## 2024-04-22 RX ADMIN — SODIUM CHLORIDE, PRESERVATIVE FREE 10 ML: 5 INJECTION INTRAVENOUS at 16:23

## 2024-04-22 RX ADMIN — MORPHINE SULFATE 4 MG: 4 INJECTION, SOLUTION INTRAMUSCULAR; INTRAVENOUS at 11:13

## 2024-04-22 RX ADMIN — HYDRALAZINE HYDROCHLORIDE 50 MG: 50 TABLET ORAL at 20:12

## 2024-04-22 RX ADMIN — ATORVASTATIN CALCIUM 80 MG: 80 TABLET, FILM COATED ORAL at 10:11

## 2024-04-22 RX ADMIN — SACUBITRIL AND VALSARTAN 1 TABLET: 97; 103 TABLET, FILM COATED ORAL at 20:12

## 2024-04-22 RX ADMIN — EMPAGLIFLOZIN 10 MG: 10 TABLET, FILM COATED ORAL at 10:12

## 2024-04-22 RX ADMIN — OXYCODONE HYDROCHLORIDE AND ACETAMINOPHEN 1 TABLET: 5; 325 TABLET ORAL at 10:11

## 2024-04-22 RX ADMIN — SACUBITRIL AND VALSARTAN 1 TABLET: 97; 103 TABLET, FILM COATED ORAL at 10:11

## 2024-04-22 RX ADMIN — PROCHLORPERAZINE EDISYLATE 10 MG: 5 INJECTION INTRAMUSCULAR; INTRAVENOUS at 17:32

## 2024-04-22 RX ADMIN — CLONIDINE HYDROCHLORIDE 0.1 MG: 0.1 TABLET ORAL at 20:12

## 2024-04-22 RX ADMIN — WATER 1000 MG: 1 INJECTION INTRAMUSCULAR; INTRAVENOUS; SUBCUTANEOUS at 15:17

## 2024-04-22 RX ADMIN — HYDRALAZINE HYDROCHLORIDE 50 MG: 50 TABLET ORAL at 05:24

## 2024-04-22 RX ADMIN — SODIUM CHLORIDE 100 ML: 9 INJECTION, SOLUTION INTRAVENOUS at 16:22

## 2024-04-22 RX ADMIN — IOPAMIDOL 75 ML: 755 INJECTION, SOLUTION INTRAVENOUS at 16:21

## 2024-04-22 RX ADMIN — HYDRALAZINE HYDROCHLORIDE 50 MG: 50 TABLET ORAL at 15:17

## 2024-04-22 ASSESSMENT — PAIN DESCRIPTION - DESCRIPTORS
DESCRIPTORS: DISCOMFORT
DESCRIPTORS: ACHING;DISCOMFORT;JABBING;PENETRATING
DESCRIPTORS: ACHING;DISCOMFORT;JABBING;PENETRATING;PRESSURE

## 2024-04-22 ASSESSMENT — PAIN DESCRIPTION - LOCATION
LOCATION: BACK

## 2024-04-22 ASSESSMENT — PAIN DESCRIPTION - ORIENTATION
ORIENTATION: LOWER
ORIENTATION: LOWER

## 2024-04-22 ASSESSMENT — ENCOUNTER SYMPTOMS
CONSTIPATION: 0
NAUSEA: 0
BLOOD IN STOOL: 0
WHEEZING: 0
ABDOMINAL PAIN: 1
VOMITING: 0
DIARRHEA: 0
CHEST TIGHTNESS: 0
SHORTNESS OF BREATH: 0
RHINORRHEA: 0
COUGH: 1

## 2024-04-22 ASSESSMENT — PAIN SCALES - GENERAL
PAINLEVEL_OUTOF10: 3
PAINLEVEL_OUTOF10: 1
PAINLEVEL_OUTOF10: 7
PAINLEVEL_OUTOF10: 8
PAINLEVEL_OUTOF10: 0

## 2024-04-22 NOTE — CASE COMMUNICATION
Discharge planning    Patient chart reviewed.  Appreciate CM assessment. Patient lives with spouse in 1 story home. DME in the home: rw and cane.      Referral made to [facility] and patient has list for HC per prior CM notes.     Patient was transferred from [place] last night for closer monitoring of CBI. A/C on hold and history of prior CVA>     Admitted with acute hemorrhagic cystitis. Urology following. Neurology and cardiology consulted due to holding of ac and concern for Intraventricular thrombus with recent recurrent CVA.

## 2024-04-22 NOTE — CARE COORDINATION
Social work:  Spoke to [facility], they are out of network with insurance.   Met with patient at bedside to discuss alternative snf choice, patient states she wants to go home instead of rehab.   Patient gave writer permission to call her  to discuss HC.   Spoke to spouse, he states patient had HC in the past. Chart reviewed, patient had [facility]. Larisa/NEGRITA informed and referral sent.   Spouse has a walker at home patient has been using at home.

## 2024-04-22 NOTE — DISCHARGE INSTR - COC
Continuity of Care Form    Patient Name: Angelica Harvey   :  1950  MRN:  [id number]    Admit date:  2024  Discharge date:  24    Code Status Order: Full Code   Advance Directives:     Admitting Physician:  Codie Webster MD  PCP: Ciro Burns PA-C    Discharging Nurse: Vivienne TAI RN  Discharging Hospital Unit/Room#: 1008/1008-02  Discharging Unit Phone Number: [phone]    Emergency Contact:   Extended Emergency Contact Information  Primary Emergency Contact: Deion Harvey  Address: [address] [place] [address]  Home Phone: [phone]  Relation: Spouse    Past Surgical History:  Past Surgical History:   Procedure Laterality Date    BRAIN SURGERY  2024    clip and coil     JOINT REPLACEMENT      TUBAL LIGATION         Immunization History:     There is no immunization history on file for this patient.    Active Problems:  Patient Active Problem List   Diagnosis Code    Acute cystitis without hematuria N30.00    Cerebrovascular accident (CVA) ([facility]) I63.9    Dyslipidemia E78.5    Coronary artery disease involving native coronary artery of native heart without angina pectoris I25.10    GERD (gastroesophageal reflux disease) K21.9    Essential hypertension I10    Heart failure with reduced ejection fraction ([facility]) I50.20    Gross hematuria R31.0    Urinary retention R33.9    Bilateral hydronephrosis N13.30    Hx of TIA (transient ischemic attack) and stroke Z86.73    Hx of cardiac arrest Z86.74    Acute pyelonephritis N10    Chest pain R07.9    Light headedness R42       Isolation/Infection:   Isolation            No Isolation          Patient Infection Status       None to display            Nurse Assessment:  Last Vital Signs: /69   Pulse (!) 101   Temp 97.9 °F (36.6 °C) (Oral)   Resp 16   Ht 1.753 m (5' 9\")   Wt 63.5 kg (140 lb)   SpO2 96%   BMI 20.67 kg/m²     Last documented pain score (0-10 scale): Pain Level: 1  Last Weight:   Wt Readings from Last 1 Encounters:

## 2024-04-22 NOTE — PLAN OF CARE
Pt had an eventful day overall. Pt had complaints of nausea, treated with prn zofran. Pt had complaints after one hour of returning nausea. Writer notified, attending, compazine prn ordered and given pt satisfied. Pt up as tolerated with pt/ot, pt had complaints of dizzness. Pt had positive standing orthos. Pt got back to bed safely. Pt has CBI running writer documented output and input for the shift. Pt has I/Os charted, VSS, pt safety maintained, call light within reach, all questions asked and addressed, wcm. Care continues.  Problem: Discharge Planning  Goal: Discharge to home or other facility with appropriate resources  Outcome: Progressing     Problem: Safety - Adult  Goal: Free from fall injury  Outcome: Progressing     Problem: Chronic Conditions and Co-morbidities  Goal: Patient's chronic conditions and co-morbidity symptoms are monitored and maintained or improved  Outcome: Progressing     Problem: Skin/Tissue Integrity - Adult  Goal: Skin integrity remains intact  Outcome: Progressing     Problem: Musculoskeletal - Adult  Goal: Return mobility to safest level of function  Outcome: Progressing     Problem: Hematologic - Adult  Goal: Maintains hematologic stability  Outcome: Progressing     Problem: Pain  Goal: Verbalizes/displays adequate comfort level or baseline comfort level  Outcome: Progressing   Patient having pain on their back and rates it a 7. Pain interventions includecorrect body alignment/body mechanics, relaxation techniques, medication, pillow support/positioning, diversional activities, and medicate per physician recommendation/order. Patients goal for pain relief is 2. The need for pain and symptom management will be considered in the discharge planning process to ensure patients comfort.

## 2024-04-22 NOTE — PLAN OF CARE
Pt is A&O x4 and has been resting in bed.  Pt was transferred from med surg to PCU.  Pt has hx of CVAs and was transferred to PCU for closer monitoring as pt's anticoagulants and antiplatelets are on hold per urology.  Pt has CBI running.  Pt's urine is yellow and clear.  Pt's hgb is currently stable at 9.4.  Pt has Q8 H&H lab draws.  Pt will have echo done today.  Pt is receiving Rocephin Q24 hours for UTI.  Pt is receiving NS at 75 mL/hr.  VSS. Pt has parameters on BP meds.  Pt has a splint on her right arm for right wrist sprain.  Bed is locked in the lowest position and call light is in reach.    Problem: Discharge Planning  Goal: Discharge to home or other facility with appropriate resources  4/21/2024 2314 by Allie Allison RN  Outcome: Progressing     Problem: Safety - Adult  Goal: Free from fall injury  4/21/2024 2314 by Allie Allison RN  Outcome: Progressing     Problem: Chronic Conditions and Co-morbidities  Goal: Patient's chronic conditions and co-morbidity symptoms are monitored and maintained or improved  4/21/2024 2314 by Allie Allison RN  Outcome: Progressing     Problem: Skin/Tissue Integrity - Adult  Goal: Skin integrity remains intact  4/21/2024 2314 by Allie Allison RN  Outcome: Progressing  Flowsheets (Taken 4/21/2024 1754 by Eboni Cordova RN)  Skin Integrity Remains Intact: Monitor for areas of redness and/or skin breakdown     Problem: Musculoskeletal - Adult  Goal: Return mobility to safest level of function  4/21/2024 2314 by Allie Allison, RN  Outcome: Progressing     Problem: Hematologic - Adult  Goal: Maintains hematologic stability  4/21/2024 2314 by Allie Allison RN  Outcome: Progressing     Problem: Pain  Goal: Verbalizes/displays adequate comfort level or baseline comfort level  4/21/2024 2314 by Allie Allison RN  Outcome: Progressing

## 2024-04-22 NOTE — CARE COORDINATION
Discharge planning    Notified per SS that patient wishes to go home now. They have had [facility] in past and want referral  to them.     Sent epic referral and notifed [facility] to see if can accept    1500  Accepted by [facility].

## 2024-04-23 ENCOUNTER — APPOINTMENT (OUTPATIENT)
Dept: ULTRASOUND IMAGING | Age: 74
DRG: 690 | End: 2024-04-23
Payer: MEDICARE

## 2024-04-23 ENCOUNTER — APPOINTMENT (OUTPATIENT)
Age: 74
DRG: 690 | End: 2024-04-23
Attending: INTERNAL MEDICINE
Payer: MEDICARE

## 2024-04-23 LAB
ECHO BSA: 1.76 M2
ERYTHROCYTE [DISTWIDTH] IN BLOOD BY AUTOMATED COUNT: 12.9 % (ref 11.8–14.4)
GLUCOSE BLD-MCNC: 111 MG/DL (ref 65–105)
HCT VFR BLD AUTO: 29 % (ref 36.3–47.1)
HCT VFR BLD AUTO: 30 % (ref 36.3–47.1)
HCT VFR BLD AUTO: 30.1 % (ref 36.3–47.1)
HCT VFR BLD AUTO: 31.1 % (ref 36.3–47.1)
HGB BLD-MCNC: 9 G/DL (ref 11.9–15.1)
HGB BLD-MCNC: 9.2 G/DL (ref 11.9–15.1)
HGB BLD-MCNC: 9.5 G/DL (ref 11.9–15.1)
HGB BLD-MCNC: 9.5 G/DL (ref 11.9–15.1)
MCH RBC QN AUTO: 28.9 PG (ref 25.2–33.5)
MCHC RBC AUTO-ENTMCNC: 31.6 G/DL (ref 28.4–34.8)
MCV RBC AUTO: 91.5 FL (ref 82.6–102.9)
NRBC BLD-RTO: 0 PER 100 WBC
PLATELET # BLD AUTO: 273 K/UL (ref 138–453)
PMV BLD AUTO: 9.3 FL (ref 8.1–13.5)
RBC # BLD AUTO: 3.29 M/UL (ref 3.95–5.11)
WBC OTHER # BLD: 12.5 K/UL (ref 3.5–11.3)

## 2024-04-23 PROCEDURE — 6370000000 HC RX 637 (ALT 250 FOR IP): Performed by: INTERNAL MEDICINE

## 2024-04-23 PROCEDURE — 93308 TTE F-UP OR LMTD: CPT | Performed by: INTERNAL MEDICINE

## 2024-04-23 PROCEDURE — 76775 US EXAM ABDO BACK WALL LIM: CPT

## 2024-04-23 PROCEDURE — 6370000000 HC RX 637 (ALT 250 FOR IP): Performed by: UROLOGY

## 2024-04-23 PROCEDURE — C8924 2D TTE W OR W/O FOL W/CON,FU: HCPCS

## 2024-04-23 PROCEDURE — 6370000000 HC RX 637 (ALT 250 FOR IP): Performed by: NURSE PRACTITIONER

## 2024-04-23 PROCEDURE — 85018 HEMOGLOBIN: CPT

## 2024-04-23 PROCEDURE — 51798 US URINE CAPACITY MEASURE: CPT

## 2024-04-23 PROCEDURE — 99232 SBSQ HOSP IP/OBS MODERATE 35: CPT | Performed by: FAMILY MEDICINE

## 2024-04-23 PROCEDURE — 6360000002 HC RX W HCPCS: Performed by: NURSE PRACTITIONER

## 2024-04-23 PROCEDURE — 51702 INSERT TEMP BLADDER CATH: CPT

## 2024-04-23 PROCEDURE — 2060000000 HC ICU INTERMEDIATE R&B

## 2024-04-23 PROCEDURE — 82947 ASSAY GLUCOSE BLOOD QUANT: CPT

## 2024-04-23 PROCEDURE — 99232 SBSQ HOSP IP/OBS MODERATE 35: CPT | Performed by: PSYCHIATRY & NEUROLOGY

## 2024-04-23 PROCEDURE — 85027 COMPLETE CBC AUTOMATED: CPT

## 2024-04-23 PROCEDURE — 94761 N-INVAS EAR/PLS OXIMETRY MLT: CPT

## 2024-04-23 PROCEDURE — 6370000000 HC RX 637 (ALT 250 FOR IP): Performed by: FAMILY MEDICINE

## 2024-04-23 PROCEDURE — 36415 COLL VENOUS BLD VENIPUNCTURE: CPT

## 2024-04-23 PROCEDURE — 2580000003 HC RX 258: Performed by: NURSE PRACTITIONER

## 2024-04-23 PROCEDURE — 85014 HEMATOCRIT: CPT

## 2024-04-23 RX ORDER — METOPROLOL SUCCINATE 50 MG/1
50 TABLET, EXTENDED RELEASE ORAL DAILY
Status: DISCONTINUED | OUTPATIENT
Start: 2024-04-23 | End: 2024-04-25 | Stop reason: HOSPADM

## 2024-04-23 RX ORDER — TAMSULOSIN HYDROCHLORIDE 0.4 MG/1
0.4 CAPSULE ORAL ONCE
Status: COMPLETED | OUTPATIENT
Start: 2024-04-23 | End: 2024-04-23

## 2024-04-23 RX ORDER — CARVEDILOL 3.12 MG/1
3.12 TABLET ORAL 2 TIMES DAILY WITH MEALS
Status: DISCONTINUED | OUTPATIENT
Start: 2024-04-23 | End: 2024-04-23

## 2024-04-23 RX ORDER — HYDROCODONE BITARTRATE AND ACETAMINOPHEN 5; 325 MG/1; MG/1
1 TABLET ORAL ONCE
Status: COMPLETED | OUTPATIENT
Start: 2024-04-23 | End: 2024-04-23

## 2024-04-23 RX ORDER — SPIRONOLACTONE 25 MG/1
25 TABLET ORAL DAILY
Status: DISCONTINUED | OUTPATIENT
Start: 2024-04-23 | End: 2024-04-25 | Stop reason: HOSPADM

## 2024-04-23 RX ADMIN — SACUBITRIL AND VALSARTAN 1 TABLET: 97; 103 TABLET, FILM COATED ORAL at 08:17

## 2024-04-23 RX ADMIN — OXYCODONE HYDROCHLORIDE AND ACETAMINOPHEN 1 TABLET: 5; 325 TABLET ORAL at 08:28

## 2024-04-23 RX ADMIN — EMPAGLIFLOZIN 10 MG: 10 TABLET, FILM COATED ORAL at 08:17

## 2024-04-23 RX ADMIN — HYDRALAZINE HYDROCHLORIDE 50 MG: 50 TABLET ORAL at 05:59

## 2024-04-23 RX ADMIN — PERFLUTREN 1.5 ML: 6.52 INJECTION, SUSPENSION INTRAVENOUS at 07:46

## 2024-04-23 RX ADMIN — SODIUM CHLORIDE, PRESERVATIVE FREE 10 ML: 5 INJECTION INTRAVENOUS at 08:18

## 2024-04-23 RX ADMIN — CARVEDILOL 3.12 MG: 3.12 TABLET, FILM COATED ORAL at 08:17

## 2024-04-23 RX ADMIN — SODIUM CHLORIDE, PRESERVATIVE FREE 10 ML: 5 INJECTION INTRAVENOUS at 19:39

## 2024-04-23 RX ADMIN — SPIRONOLACTONE 25 MG: 25 TABLET ORAL at 16:49

## 2024-04-23 RX ADMIN — METOPROLOL SUCCINATE 50 MG: 50 TABLET, EXTENDED RELEASE ORAL at 16:49

## 2024-04-23 RX ADMIN — HYDROCODONE BITARTRATE AND ACETAMINOPHEN 1 TABLET: 5; 325 TABLET ORAL at 20:58

## 2024-04-23 RX ADMIN — WATER 1000 MG: 1 INJECTION INTRAMUSCULAR; INTRAVENOUS; SUBCUTANEOUS at 14:16

## 2024-04-23 RX ADMIN — ATORVASTATIN CALCIUM 80 MG: 80 TABLET, FILM COATED ORAL at 08:17

## 2024-04-23 RX ADMIN — TAMSULOSIN HYDROCHLORIDE 0.4 MG: 0.4 CAPSULE ORAL at 08:18

## 2024-04-23 RX ADMIN — SACUBITRIL AND VALSARTAN 1 TABLET: 97; 103 TABLET, FILM COATED ORAL at 19:39

## 2024-04-23 ASSESSMENT — PAIN SCALES - GENERAL
PAINLEVEL_OUTOF10: 6
PAINLEVEL_OUTOF10: 7
PAINLEVEL_OUTOF10: 4
PAINLEVEL_OUTOF10: 8

## 2024-04-23 ASSESSMENT — PAIN DESCRIPTION - ONSET: ONSET: PROGRESSIVE

## 2024-04-23 ASSESSMENT — PAIN DESCRIPTION - ORIENTATION
ORIENTATION: LOWER
ORIENTATION: LOWER

## 2024-04-23 ASSESSMENT — PAIN DESCRIPTION - LOCATION
LOCATION: BACK
LOCATION: BACK

## 2024-04-23 ASSESSMENT — ENCOUNTER SYMPTOMS
COUGH: 0
ABDOMINAL PAIN: 0
CHEST TIGHTNESS: 0
CONSTIPATION: 0
RHINORRHEA: 0
DIARRHEA: 0
WHEEZING: 0
BLOOD IN STOOL: 0
SHORTNESS OF BREATH: 0
VOMITING: 0
NAUSEA: 0

## 2024-04-23 ASSESSMENT — PAIN DESCRIPTION - PAIN TYPE: TYPE: ACUTE PAIN

## 2024-04-23 ASSESSMENT — PAIN - FUNCTIONAL ASSESSMENT
PAIN_FUNCTIONAL_ASSESSMENT: ACTIVITIES ARE NOT PREVENTED
PAIN_FUNCTIONAL_ASSESSMENT: ACTIVITIES ARE NOT PREVENTED

## 2024-04-23 ASSESSMENT — PAIN DESCRIPTION - DESCRIPTORS
DESCRIPTORS: ACHING
DESCRIPTORS: DISCOMFORT

## 2024-04-23 ASSESSMENT — PAIN DESCRIPTION - FREQUENCY: FREQUENCY: INTERMITTENT

## 2024-04-23 NOTE — FLOWSHEET NOTE
04/23/24 1536   Urine Assessment   Bladder Scan Volume (mL) 429 mL   $ Bladder scan $ Yes     Writer spoke with Dr. Melendez regarding urinary retention in patient. Pt attempted to use bathroom with no success. Orders to place meza, NPO at midnight for a cystoscopy in the am with Dr. Melendez. Us renal ordered and completed.     1655: Meza in place. Pt tolerated well. 450 ml urine from meza after placement.   Care ongoing

## 2024-04-23 NOTE — FLOWSHEET NOTE
04/23/24 1230   Urine Assessment   Bladder Scan Volume (mL) 230 mL   $ Bladder scan $ Yes     Writer called Dr. Melendez per morning instructions in regards to the bladder scan. Stated pt tried to urinate and had no luck. Orders to bladder scan again around 1500. Care ongoing.

## 2024-04-23 NOTE — CONSULTS
[facility]   IN-PATIENT SERVICE      NEUROLOGY CONSULT  NOTE            Date:   4/22/2024  Patient name:  Angelica Harvey  Date of admission:  4/20/2024  YOB: 1950      Chief Complaint:     Chief Complaint   Patient presents with    Chest Pain     PT states she has been having lower back pain and chest pain for a week.     Back Pain    Flank Pain       Reason for Consult:      Hx of stroke/TIA    History of Present Illness:     73-year-old female with past medical history of hypertension, hyperlipidemia, CAD, previous right MCA stroke, cerebral aneurysm status post coiling, history of STEMI with cardiac arrest (2019), ?seizures not on ASM, CHF, who is currently admitted with cystitis/pyelonephritis, hematuria.  Neurology consulted due to history of stroke/TIA, management of antiplatelet/anticoagulation.  History obtained from patient,  over phone, and chart review.  Patient and  are poor historians.    Patient presented on 4/20 with severe flank pain, fatigue.  She was found to have UTI/cystitis.  Started on antibiotics.  Urology subsequently consulted.  She was started on bladder irrigation.  Recommended to DC blood thinners.  Of note, she was initially started on Lovenox as OSH CT from last week demonstrated left ventricular apical thinning and hyperenhancement, possible sequela of MI versus mild volume thrombus? per report.     Patient has a significant neurological history.  She had a cardiac arrest/STEMI in 2019 but subsequently recovered with no major residual deficits.  She has a history of stroke years ago per .  This was in setting of cerebral aneurysm which was subsequently coiled.  No residual deficits.  In January of this year, she presented to [facility] with facial droop, altered mentation/aphasia.  Initial CT brain with no acute findings.  CTA head and neck demonstrated carotid and intracerebral atherosclerotic disease.  She cannot have MRI due to 
oxyCODONE-acetaminophen    Allergies:  Patient has no known allergies.    Social History:    Social History     Socioeconomic History    Marital status:      Spouse name: Not on file    Number of children: Not on file    Years of education: Not on file    Highest education level: Not on file   Occupational History    Not on file   Tobacco Use    Smoking status: Never    Smokeless tobacco: Not on file   Substance and Sexual Activity    Alcohol use: No    Drug use: No    Sexual activity: Not on file   Other Topics Concern    Not on file   Social History Narrative    Not on file     Social Determinants of Health     Financial Resource Strain: Not on file   Food Insecurity: No Food Insecurity (4/20/2024)    Hunger Vital Sign     Worried About Running Out of Food in the Last Year: Never true     Ran Out of Food in the Last Year: Never true   Transportation Needs: No Transportation Needs (4/20/2024)    PRAPARE - Transportation     Lack of Transportation (Medical): No     Lack of Transportation (Non-Medical): No   Physical Activity: Not on file   Stress: Not on file   Social Connections: Not on file   Intimate Partner Violence: Not on file   Housing Stability: Low Risk  (4/20/2024)    Housing Stability Vital Sign     Unable to Pay for Housing in the Last Year: No     Number of Places Lived in the Last Year: 1     Unstable Housing in the Last Year: No       Family History:    Family History   Problem Relation Age of Onset    Dementia Mother        Physical Exam:      This a 73 y.o. female   Patient Vitals for the past 24 hrs:   BP Temp Temp src Pulse Resp SpO2 Height Weight   04/21/24 0745 107/76 97.7 °F (36.5 °C) Oral (!) 102 18 96 % -- --   04/21/24 0615 109/75 -- -- -- -- -- -- --   04/21/24 0419 129/88 98.6 °F (37 °C) Oral 98 17 95 % -- --   04/21/24 0203 (!) 129/90 -- -- 79 -- -- -- --   04/21/24 0020 98/70 98.3 °F (36.8 °C) Oral 100 16 96 % -- --   04/20/24 1955 -- -- -- -- 12 -- -- --   04/20/24 1921 (!) 
mouth sores or sore throat.  Cardiovascular: No chest pain, dyspnea on exertion, palpitations or loss of consciousness. No cough, hemoptysis, pleuritic pain, or phlebitis.  Respiratory: No cough or wheezing, no sputum production. No hematemesis.    Gastrointestinal: No abdominal pain, appetite loss, blood in stools. No change in bowel or bladder habits.  Genitourinary: No dysuria, trouble voiding, or hematuria.  Musculoskeletal:  No gait disturbance, weakness or joint complaints.  Integumentary: No rash or pruritis.  Neurological: No headache, diplopia, change in muscle strength, numbness or tingling. No change in gait, balance, coordination, mood, affect, memory, mentation, behavior.  Psychiatric: No anxiety, or depression.  Endocrine: No temperature intolerance. No excessive thirst, fluid intake, or urination. No tremor.  Hematologic/Lymphatic: No abnormal bruising or bleeding, blood clots or swollen lymph nodes.  Allergic/Immunologic: No nasal congestion or hives.       Physical Exam    Vital Signs: /71   Pulse (!) 101   Temp 98.4 °F (36.9 °C) (Oral)   Resp 17   Ht 1.753 m (5' 9\")   Wt 63.5 kg (140 lb)   SpO2 98%   BMI 20.67 kg/m²        Admission Weight - Scale: 63.5 kg (140 lb)     General appearance: Awake, Alert Cooperative    Head: Normocephalic, without obvious abnormality, atraumatic    Eyes: Conjunctivae/corneas clear. PERRL, EOM's intact. Fundi benign    Neck: no adenopathy, no carotid bruit, no JVD, supple, symmetrical, trachea midline and thyroid: not enlarged, symmetric, no tenderness/mass/nodules    Lungs: clear to auscultation bilaterally    Heart: regular rate and rhythm, S1, S2 normal, no murmur, click, rub or gallop    Abdomen: Soft, non-tender. Bowel sounds normal. No masses,  no organomegaly    Extremities: extremities normal, atraumatic, no cyanosis or edema    Skin: Skin color, texture, turgor normal. No rashes or lesions    Neurologic: Grossly normal            Labs:      CBC:

## 2024-04-23 NOTE — CARE COORDINATION
Discharge planning    Chart reviewed. Patient from home with spouse in 1 story house. He assists her with her ADLs as needed. Has a RW< cane if needed.     Patient is now wishing to go home and accepted by [facility].     Admitted with acute hemorrhagic cystitis. Urology following. Neurology and cardiology following due to holding of ac and concern for Intraventricular thrombus with recent recurrent CVA.     Neuro recommending asa 81 when cleared.     Urology notes will eventually require cysto to evaluate hematuria. He did round this am, orders to remove meza and bladder scan at noon and call him with results.

## 2024-04-24 ENCOUNTER — ANESTHESIA EVENT (OUTPATIENT)
Dept: OPERATING ROOM | Age: 74
DRG: 690 | End: 2024-04-24
Payer: MEDICARE

## 2024-04-24 ENCOUNTER — ANESTHESIA (OUTPATIENT)
Dept: OPERATING ROOM | Age: 74
DRG: 690 | End: 2024-04-24
Payer: MEDICARE

## 2024-04-24 LAB
ANION GAP SERPL CALCULATED.3IONS-SCNC: 7 MMOL/L (ref 9–17)
BILIRUB UR QL STRIP: NEGATIVE
BUN SERPL-MCNC: 26 MG/DL (ref 8–23)
BUN/CREAT SERPL: 22 (ref 9–20)
CALCIUM SERPL-MCNC: 8.2 MG/DL (ref 8.6–10.4)
CHLORIDE SERPL-SCNC: 105 MMOL/L (ref 98–107)
CLARITY UR: CLEAR
CO2 SERPL-SCNC: 22 MMOL/L (ref 20–31)
COLOR UR: YELLOW
CREAT SERPL-MCNC: 1.2 MG/DL (ref 0.5–0.9)
EPI CELLS #/AREA URNS HPF: NORMAL /HPF (ref 0–5)
ERYTHROCYTE [DISTWIDTH] IN BLOOD BY AUTOMATED COUNT: 12.9 % (ref 11.8–14.4)
GFR SERPL CREATININE-BSD FRML MDRD: 48 ML/MIN/1.73M2
GLUCOSE SERPL-MCNC: 83 MG/DL (ref 70–99)
GLUCOSE UR STRIP-MCNC: ABNORMAL MG/DL
HCT VFR BLD AUTO: 29.2 % (ref 36.3–47.1)
HCT VFR BLD AUTO: 32.1 % (ref 36.3–47.1)
HGB BLD-MCNC: 9.1 G/DL (ref 11.9–15.1)
HGB BLD-MCNC: 9.9 G/DL (ref 11.9–15.1)
HGB UR QL STRIP.AUTO: ABNORMAL
KETONES UR STRIP-MCNC: NEGATIVE MG/DL
LEUKOCYTE ESTERASE UR QL STRIP: ABNORMAL
MCH RBC QN AUTO: 28.9 PG (ref 25.2–33.5)
MCHC RBC AUTO-ENTMCNC: 31.2 G/DL (ref 28.4–34.8)
MCV RBC AUTO: 92.7 FL (ref 82.6–102.9)
NITRITE UR QL STRIP: NEGATIVE
NRBC BLD-RTO: 0 PER 100 WBC
PH UR STRIP: 5.5 [PH] (ref 5–8)
PLATELET # BLD AUTO: 267 K/UL (ref 138–453)
PMV BLD AUTO: 8.7 FL (ref 8.1–13.5)
POTASSIUM SERPL-SCNC: 4.3 MMOL/L (ref 3.7–5.3)
PROT UR STRIP-MCNC: NEGATIVE MG/DL
RBC # BLD AUTO: 3.15 M/UL (ref 3.95–5.11)
RBC #/AREA URNS HPF: NORMAL /HPF (ref 0–2)
SODIUM SERPL-SCNC: 134 MMOL/L (ref 135–144)
SP GR UR STRIP: 1.02 (ref 1–1.03)
UROBILINOGEN UR STRIP-ACNC: NORMAL EU/DL (ref 0–1)
WBC #/AREA URNS HPF: NORMAL /HPF (ref 0–5)
WBC OTHER # BLD: 10.3 K/UL (ref 3.5–11.3)

## 2024-04-24 PROCEDURE — 85027 COMPLETE CBC AUTOMATED: CPT

## 2024-04-24 PROCEDURE — 3700000000 HC ANESTHESIA ATTENDED CARE: Performed by: UROLOGY

## 2024-04-24 PROCEDURE — 80048 BASIC METABOLIC PNL TOTAL CA: CPT

## 2024-04-24 PROCEDURE — 6370000000 HC RX 637 (ALT 250 FOR IP): Performed by: FAMILY MEDICINE

## 2024-04-24 PROCEDURE — 99232 SBSQ HOSP IP/OBS MODERATE 35: CPT | Performed by: FAMILY MEDICINE

## 2024-04-24 PROCEDURE — 36415 COLL VENOUS BLD VENIPUNCTURE: CPT

## 2024-04-24 PROCEDURE — 6360000002 HC RX W HCPCS: Performed by: UROLOGY

## 2024-04-24 PROCEDURE — 6370000000 HC RX 637 (ALT 250 FOR IP): Performed by: UROLOGY

## 2024-04-24 PROCEDURE — 94761 N-INVAS EAR/PLS OXIMETRY MLT: CPT

## 2024-04-24 PROCEDURE — 51702 INSERT TEMP BLADDER CATH: CPT

## 2024-04-24 PROCEDURE — 2580000003 HC RX 258: Performed by: NURSE PRACTITIONER

## 2024-04-24 PROCEDURE — 3700000001 HC ADD 15 MINUTES (ANESTHESIA): Performed by: UROLOGY

## 2024-04-24 PROCEDURE — 7100000000 HC PACU RECOVERY - FIRST 15 MIN: Performed by: UROLOGY

## 2024-04-24 PROCEDURE — 2709999900 HC NON-CHARGEABLE SUPPLY: Performed by: UROLOGY

## 2024-04-24 PROCEDURE — 6370000000 HC RX 637 (ALT 250 FOR IP): Performed by: NURSE PRACTITIONER

## 2024-04-24 PROCEDURE — 2500000003 HC RX 250 WO HCPCS: Performed by: NURSE ANESTHETIST, CERTIFIED REGISTERED

## 2024-04-24 PROCEDURE — 81001 URINALYSIS AUTO W/SCOPE: CPT

## 2024-04-24 PROCEDURE — 85018 HEMOGLOBIN: CPT

## 2024-04-24 PROCEDURE — 85014 HEMATOCRIT: CPT

## 2024-04-24 PROCEDURE — 3600000002 HC SURGERY LEVEL 2 BASE: Performed by: UROLOGY

## 2024-04-24 PROCEDURE — 0TJB8ZZ INSPECTION OF BLADDER, VIA NATURAL OR ARTIFICIAL OPENING ENDOSCOPIC: ICD-10-PCS | Performed by: UROLOGY

## 2024-04-24 PROCEDURE — 2060000000 HC ICU INTERMEDIATE R&B

## 2024-04-24 PROCEDURE — 2580000003 HC RX 258: Performed by: UROLOGY

## 2024-04-24 PROCEDURE — 3600000012 HC SURGERY LEVEL 2 ADDTL 15MIN: Performed by: UROLOGY

## 2024-04-24 PROCEDURE — 6360000002 HC RX W HCPCS: Performed by: NURSE ANESTHETIST, CERTIFIED REGISTERED

## 2024-04-24 PROCEDURE — 6370000000 HC RX 637 (ALT 250 FOR IP): Performed by: INTERNAL MEDICINE

## 2024-04-24 PROCEDURE — 2580000003 HC RX 258: Performed by: NURSE ANESTHETIST, CERTIFIED REGISTERED

## 2024-04-24 PROCEDURE — 7100000001 HC PACU RECOVERY - ADDTL 15 MIN: Performed by: UROLOGY

## 2024-04-24 RX ORDER — SODIUM CHLORIDE 0.9 % (FLUSH) 0.9 %
5-40 SYRINGE (ML) INJECTION EVERY 12 HOURS SCHEDULED
Status: DISCONTINUED | OUTPATIENT
Start: 2024-04-24 | End: 2024-04-24

## 2024-04-24 RX ORDER — PROPOFOL 10 MG/ML
INJECTION, EMULSION INTRAVENOUS PRN
Status: DISCONTINUED | OUTPATIENT
Start: 2024-04-24 | End: 2024-04-24 | Stop reason: SDUPTHER

## 2024-04-24 RX ORDER — FENTANYL CITRATE 0.05 MG/ML
25 INJECTION, SOLUTION INTRAMUSCULAR; INTRAVENOUS EVERY 5 MIN PRN
Status: DISCONTINUED | OUTPATIENT
Start: 2024-04-24 | End: 2024-04-24

## 2024-04-24 RX ORDER — TAMSULOSIN HYDROCHLORIDE 0.4 MG/1
0.8 CAPSULE ORAL ONCE
Status: COMPLETED | OUTPATIENT
Start: 2024-04-24 | End: 2024-04-24

## 2024-04-24 RX ORDER — LIDOCAINE HYDROCHLORIDE 20 MG/ML
INJECTION, SOLUTION EPIDURAL; INFILTRATION; INTRACAUDAL; PERINEURAL PRN
Status: DISCONTINUED | OUTPATIENT
Start: 2024-04-24 | End: 2024-04-24 | Stop reason: SDUPTHER

## 2024-04-24 RX ORDER — FENTANYL CITRATE 50 UG/ML
INJECTION, SOLUTION INTRAMUSCULAR; INTRAVENOUS PRN
Status: DISCONTINUED | OUTPATIENT
Start: 2024-04-24 | End: 2024-04-24 | Stop reason: SDUPTHER

## 2024-04-24 RX ORDER — HYDROCODONE BITARTRATE AND ACETAMINOPHEN 5; 325 MG/1; MG/1
1 TABLET ORAL EVERY 6 HOURS PRN
Status: DISCONTINUED | OUTPATIENT
Start: 2024-04-24 | End: 2024-04-25 | Stop reason: HOSPADM

## 2024-04-24 RX ORDER — NALOXONE HYDROCHLORIDE 0.4 MG/ML
INJECTION, SOLUTION INTRAMUSCULAR; INTRAVENOUS; SUBCUTANEOUS PRN
Status: DISCONTINUED | OUTPATIENT
Start: 2024-04-24 | End: 2024-04-24

## 2024-04-24 RX ORDER — SODIUM CHLORIDE 9 MG/ML
INJECTION, SOLUTION INTRAVENOUS PRN
Status: DISCONTINUED | OUTPATIENT
Start: 2024-04-24 | End: 2024-04-24

## 2024-04-24 RX ORDER — LIDOCAINE HYDROCHLORIDE 20 MG/ML
JELLY TOPICAL PRN
Status: DISCONTINUED | OUTPATIENT
Start: 2024-04-24 | End: 2024-04-24 | Stop reason: ALTCHOICE

## 2024-04-24 RX ORDER — SODIUM CHLORIDE 0.9 % (FLUSH) 0.9 %
5-40 SYRINGE (ML) INJECTION PRN
Status: DISCONTINUED | OUTPATIENT
Start: 2024-04-24 | End: 2024-04-24

## 2024-04-24 RX ORDER — SODIUM CHLORIDE, SODIUM LACTATE, POTASSIUM CHLORIDE, CALCIUM CHLORIDE 600; 310; 30; 20 MG/100ML; MG/100ML; MG/100ML; MG/100ML
INJECTION, SOLUTION INTRAVENOUS CONTINUOUS PRN
Status: DISCONTINUED | OUTPATIENT
Start: 2024-04-24 | End: 2024-04-24 | Stop reason: SDUPTHER

## 2024-04-24 RX ORDER — ONDANSETRON 2 MG/ML
4 INJECTION INTRAMUSCULAR; INTRAVENOUS
Status: DISCONTINUED | OUTPATIENT
Start: 2024-04-24 | End: 2024-04-24

## 2024-04-24 RX ADMIN — PROPOFOL 40 MG: 10 INJECTION, EMULSION INTRAVENOUS at 12:13

## 2024-04-24 RX ADMIN — LIDOCAINE HYDROCHLORIDE 40 MG: 20 INJECTION, SOLUTION EPIDURAL; INFILTRATION; INTRACAUDAL; PERINEURAL at 12:13

## 2024-04-24 RX ADMIN — SODIUM CHLORIDE, POTASSIUM CHLORIDE, SODIUM LACTATE AND CALCIUM CHLORIDE: 600; 310; 30; 20 INJECTION, SOLUTION INTRAVENOUS at 12:08

## 2024-04-24 RX ADMIN — SODIUM CHLORIDE, PRESERVATIVE FREE 10 ML: 5 INJECTION INTRAVENOUS at 20:16

## 2024-04-24 RX ADMIN — SACUBITRIL AND VALSARTAN 1 TABLET: 97; 103 TABLET, FILM COATED ORAL at 09:05

## 2024-04-24 RX ADMIN — TAMSULOSIN HYDROCHLORIDE 0.8 MG: 0.4 CAPSULE ORAL at 17:19

## 2024-04-24 RX ADMIN — ACETAMINOPHEN 650 MG: 325 TABLET ORAL at 09:05

## 2024-04-24 RX ADMIN — FENTANYL CITRATE 25 MCG: 50 INJECTION INTRAMUSCULAR; INTRAVENOUS at 12:13

## 2024-04-24 RX ADMIN — SACUBITRIL AND VALSARTAN 1 TABLET: 97; 103 TABLET, FILM COATED ORAL at 20:16

## 2024-04-24 RX ADMIN — EMPAGLIFLOZIN 10 MG: 10 TABLET, FILM COATED ORAL at 09:05

## 2024-04-24 RX ADMIN — ENOXAPARIN SODIUM 60 MG: 100 INJECTION SUBCUTANEOUS at 20:16

## 2024-04-24 RX ADMIN — ATORVASTATIN CALCIUM 80 MG: 80 TABLET, FILM COATED ORAL at 09:05

## 2024-04-24 RX ADMIN — PROPOFOL 50 MCG/KG/MIN: 10 INJECTION, EMULSION INTRAVENOUS at 12:15

## 2024-04-24 RX ADMIN — FENTANYL CITRATE 25 MCG: 50 INJECTION INTRAMUSCULAR; INTRAVENOUS at 12:09

## 2024-04-24 RX ADMIN — Medication 500 MG: at 20:16

## 2024-04-24 RX ADMIN — HYDROCODONE BITARTRATE AND ACETAMINOPHEN 1 TABLET: 5; 325 TABLET ORAL at 23:39

## 2024-04-24 RX ADMIN — METOPROLOL SUCCINATE 50 MG: 50 TABLET, EXTENDED RELEASE ORAL at 09:07

## 2024-04-24 RX ADMIN — HYDROCODONE BITARTRATE AND ACETAMINOPHEN 1 TABLET: 5; 325 TABLET ORAL at 17:33

## 2024-04-24 RX ADMIN — SPIRONOLACTONE 25 MG: 25 TABLET ORAL at 09:05

## 2024-04-24 RX ADMIN — SODIUM CHLORIDE, PRESERVATIVE FREE 10 ML: 5 INJECTION INTRAVENOUS at 09:06

## 2024-04-24 ASSESSMENT — PAIN SCALES - GENERAL
PAINLEVEL_OUTOF10: 6
PAINLEVEL_OUTOF10: 0
PAINLEVEL_OUTOF10: 3
PAINLEVEL_OUTOF10: 8
PAINLEVEL_OUTOF10: 3

## 2024-04-24 ASSESSMENT — PAIN DESCRIPTION - LOCATION
LOCATION: BACK

## 2024-04-24 ASSESSMENT — ENCOUNTER SYMPTOMS
COUGH: 0
CONSTIPATION: 0
WHEEZING: 0
VOMITING: 0
ABDOMINAL PAIN: 0
SHORTNESS OF BREATH: 0
RHINORRHEA: 0
DIARRHEA: 0
BLOOD IN STOOL: 0
CHEST TIGHTNESS: 0
NAUSEA: 0

## 2024-04-24 ASSESSMENT — PAIN DESCRIPTION - ONSET: ONSET: PROGRESSIVE

## 2024-04-24 ASSESSMENT — PAIN DESCRIPTION - DESCRIPTORS
DESCRIPTORS: ACHING

## 2024-04-24 ASSESSMENT — PAIN DESCRIPTION - FREQUENCY: FREQUENCY: INTERMITTENT

## 2024-04-24 ASSESSMENT — PAIN - FUNCTIONAL ASSESSMENT
PAIN_FUNCTIONAL_ASSESSMENT: ACTIVITIES ARE NOT PREVENTED
PAIN_FUNCTIONAL_ASSESSMENT: NONE - DENIES PAIN

## 2024-04-24 ASSESSMENT — PAIN DESCRIPTION - PAIN TYPE
TYPE: ACUTE PAIN
TYPE: ACUTE PAIN

## 2024-04-24 ASSESSMENT — PAIN DESCRIPTION - ORIENTATION
ORIENTATION: LEFT
ORIENTATION: LOWER
ORIENTATION: MID

## 2024-04-24 NOTE — OP NOTE
Operative Note      Patient: Angelica Harvey  YOB: 1950  MRN: [id number]    Date of Procedure: 4/24/2024    Pre-Op Diagnosis Codes:     * Gross hematuria [R31.0]  Urinary retention  Post-Op Diagnosis: Hemorrhagic cystitis  Chronic retention of urine       Procedure(s):  CYSTOSCOPY    Surgeon(s):  Aleksandr Melendez MD    Assistant:   * No surgical staff found *    Anesthesia: General    Estimated Blood Loss (mL): Minimal    Complications: None    Specimens:   ID Type Source Tests Collected by Time Destination   1 : URINE Urine Urine, Cystoscopic FISH, UROVYSION, URINALYSIS WITH REFLEX TO CULTURE Aleksandr Melendez MD 4/24/2024 1225        Implants:  * No implants in log *      Drains:   Urinary Catheter 04/23/24 2 Way (Active)   $ Urethral catheter insertion $ Not inserted for procedure 04/24/24 0800   Catheter Indications Urinary retention (acute or chronic), continuous bladder irrigation or bladder outlet obstruction 04/24/24 0800   Site Assessment No urethral drainage 04/24/24 0800   Urine Color Yellow;Straw 04/24/24 0800   Urine Appearance Clear 04/24/24 0800   Urine Odor Other (Comment) 04/23/24 1653   Collection Container Standard 04/24/24 0800   Securement Method Securing device (Describe) 04/24/24 0800   Catheter Best Practices  Drainage tube clipped to bed;Catheter secured to thigh;Bag below bladder;Bag not on floor;Lack of dependent loop in tubing;Drainage bag less than half full 04/24/24 0800   Status Draining 04/23/24 1653   Output (mL) 400 mL 04/24/24 0543       [REMOVED] Urinary Catheter 04/21/24 3 Way (Removed)   Catheter Indications Urinary retention (acute or chronic), continuous bladder irrigation or bladder outlet obstruction 04/22/24 2000   Site Assessment No urethral drainage 04/22/24 2000   Urine Color Yellow 04/22/24 2000   Urine Appearance Clear 04/22/24 2000   Collection Container Standard 04/22/24 2000   Securement Method Securing device (Describe) 04/22/24 2000   Catheter Care  Soap and

## 2024-04-24 NOTE — ANESTHESIA PRE PROCEDURE
Department of Anesthesiology  Preprocedure Note       Name:  Angelica Harvey   Age:  73 y.o.  :  1950                                          MRN:  [id number]         Date:  2024      Surgeon: Surgeon(s):  Aleksandr Melendez MD    Procedure: Procedure(s):  CYSTOSCOPY    Medications prior to admission:   Prior to Admission medications    Medication Sig Start Date End Date Taking? Authorizing Provider   atorvastatin (LIPITOR) 80 MG tablet Take 1 tablet by mouth daily   Yes ProviderMaribel MD   sacubitril-valsartan (ENTRESTO)  MG per tablet Take 1 tablet by mouth 2 times daily   Yes Maribel Bansal MD   NIFEdipine (PROCARDIA XL) 90 MG extended release tablet Take 1 tablet by mouth daily 10/26/23   Maribel Bansal MD   dapagliflozin (FARXIGA) 10 MG tablet Take 1 tablet by mouth daily 7/3/23   Maribel Bansal MD   aspirin 81 MG chewable tablet Take 1 tablet by mouth daily 22   ProviderMaribel MD       Current medications:    Current Facility-Administered Medications   Medication Dose Route Frequency Provider Last Rate Last Admin   • metoprolol succinate (TOPROL XL) extended release tablet 50 mg  50 mg Oral Daily Lily Saba MD   50 mg at 24 0907   • spironolactone (ALDACTONE) tablet 25 mg  25 mg Oral Daily Lily Saba MD   25 mg at 24 0905   • sodium chloride flush 0.9 % injection 10 mL  10 mL IntraVENous PRN Codie Webster MD       • prochlorperazine (COMPAZINE) injection 10 mg  10 mg IntraVENous Q6H PRN Codie Webster MD   10 mg at 24 1732   • sodium chloride flush 0.9 % injection 10 mL  10 mL IntraVENous PRN Paulie Garcia APRN - NP   10 mL at 24 1437   • calcium carbonate (TUMS) chewable tablet 500 mg  500 mg Oral TID PRN Schwab, Tracy, APRN - CNP   500 mg at 24   • sodium chloride flush 0.9 % injection 5-40 mL  5-40 mL IntraVENous 2 times per day Paulie Garcia APRN - NP   10 mL at 24 0906   • sodium

## 2024-04-24 NOTE — PLAN OF CARE
Pt has had no acute events overnight.   Vitals stable and received norco once for pain. Pt satisfied.   Lawrence in place with adequate urine output.   Npo since midnight for plan for cysto today.   She has remained free from falls.   All needs met at this time.    Problem: Discharge Planning  Goal: Discharge to home or other facility with appropriate resources  Outcome: Progressing  Flowsheets (Taken 4/23/2024 1939)  Discharge to home or other facility with appropriate resources: Identify barriers to discharge with patient and caregiver     Problem: Safety - Adult  Goal: Free from fall injury  Outcome: Progressing     Problem: Chronic Conditions and Co-morbidities  Goal: Patient's chronic conditions and co-morbidity symptoms are monitored and maintained or improved  Outcome: Progressing  Flowsheets (Taken 4/23/2024 1939)  Care Plan - Patient's Chronic Conditions and Co-Morbidity Symptoms are Monitored and Maintained or Improved: Monitor and assess patient's chronic conditions and comorbid symptoms for stability, deterioration, or improvement     Problem: Skin/Tissue Integrity - Adult  Goal: Skin integrity remains intact  Outcome: Progressing  Flowsheets (Taken 4/23/2024 1939)  Skin Integrity Remains Intact: Monitor for areas of redness and/or skin breakdown     Problem: Musculoskeletal - Adult  Goal: Return mobility to safest level of function  Outcome: Progressing  Flowsheets (Taken 4/23/2024 1939)  Return Mobility to Safest Level of Function: Assess patient stability and activity tolerance for standing, transferring and ambulating with or without assistive devices     Problem: Hematologic - Adult  Goal: Maintains hematologic stability  Outcome: Progressing  Flowsheets (Taken 4/23/2024 1939)  Maintains hematologic stability: Assess for signs and symptoms of bleeding or hemorrhage     Problem: Pain  Goal: Verbalizes/displays adequate comfort level or baseline comfort level  Outcome: Progressing     Problem: ABCDS

## 2024-04-24 NOTE — PLAN OF CARE
Pt had cysto done this morning. Dr. Melendez in to see pt after return to floor. One time order for flomax. Given per order. He also states to bladder scan pt in 6 hours (2000) and if >250 reinsert meza. Pt c/o lower back pain but does not want tylenol and asks for something stronger. Notified Dr. Webster and order obtained for Norco. Safety precautions remain in place.       Problem: Safety - Adult  Goal: Free from fall injury  4/24/2024 1632 by Farrah Sinha, RN  Outcome: Progressing     Problem: Chronic Conditions and Co-morbidities  Goal: Patient's chronic conditions and co-morbidity symptoms are monitored and maintained or improved  4/24/2024 1632 by Farrah Sinha RN  Outcome: Progressing     Problem: Pain  Goal: Verbalizes/displays adequate comfort level or baseline comfort level  4/24/2024 1632 by Farrah Sinha, RN  Outcome: Progressing

## 2024-04-24 NOTE — ANESTHESIA POSTPROCEDURE EVALUATION
Department of Anesthesiology  Postprocedure Note    Patient: Angelica Harvey  MRN: [id number]  YOB: 1950  Date of evaluation: 4/24/2024    Procedure Summary       Date: 04/24/24 Room / Location: [facility]    Anesthesia Start: 1208 Anesthesia Stop: 1237    Procedure: CYSTOSCOPY Diagnosis:       Gross hematuria      (Gross hematuria [R31.0])    Surgeons: Aleksandr Melendez MD Responsible Provider: Denys Bustillos DO    Anesthesia Type: MAC ASA Status: 3            Anesthesia Type: MAC    Eligio Phase I: Eligio Score: 9    Eligio Phase II:      Anesthesia Post Evaluation    Patient location during evaluation: PACU  Patient participation: complete - patient participated  Level of consciousness: awake and alert  Airway patency: patent  Nausea & Vomiting: no nausea and no vomiting  Cardiovascular status: hemodynamically stable  Respiratory status: acceptable  Hydration status: stable  Pain management: adequate    No notable events documented.

## 2024-04-25 ENCOUNTER — HOSPITAL ENCOUNTER (EMERGENCY)
Age: 74
Discharge: HOME | End: 2024-04-25
Attending: EMERGENCY MEDICINE
Payer: MEDICARE

## 2024-04-25 VITALS
DIASTOLIC BLOOD PRESSURE: 100 MMHG | HEIGHT: 69 IN | SYSTOLIC BLOOD PRESSURE: 148 MMHG | RESPIRATION RATE: 18 BRPM | BODY MASS INDEX: 20.73 KG/M2 | OXYGEN SATURATION: 100 % | WEIGHT: 140 LBS | TEMPERATURE: 98.2 F | HEART RATE: 100 BPM

## 2024-04-25 VITALS
WEIGHT: 147.5 LBS | OXYGEN SATURATION: 99 % | HEART RATE: 92 BPM | BODY MASS INDEX: 21.84 KG/M2 | TEMPERATURE: 98.2 F | SYSTOLIC BLOOD PRESSURE: 144 MMHG | HEIGHT: 69 IN | DIASTOLIC BLOOD PRESSURE: 94 MMHG | RESPIRATION RATE: 17 BRPM

## 2024-04-25 DIAGNOSIS — T83.9XXA FOLEY CATHETER PROBLEM, INITIAL ENCOUNTER (HCC): Primary | ICD-10-CM

## 2024-04-25 PROCEDURE — 97530 THERAPEUTIC ACTIVITIES: CPT

## 2024-04-25 PROCEDURE — 6370000000 HC RX 637 (ALT 250 FOR IP): Performed by: UROLOGY

## 2024-04-25 PROCEDURE — 99282 EMERGENCY DEPT VISIT SF MDM: CPT

## 2024-04-25 PROCEDURE — 97116 GAIT TRAINING THERAPY: CPT

## 2024-04-25 PROCEDURE — 6370000000 HC RX 637 (ALT 250 FOR IP): Performed by: FAMILY MEDICINE

## 2024-04-25 PROCEDURE — 6360000002 HC RX W HCPCS: Performed by: UROLOGY

## 2024-04-25 PROCEDURE — 97535 SELF CARE MNGMENT TRAINING: CPT

## 2024-04-25 PROCEDURE — 99239 HOSP IP/OBS DSCHRG MGMT >30: CPT | Performed by: FAMILY MEDICINE

## 2024-04-25 PROCEDURE — 2580000003 HC RX 258: Performed by: UROLOGY

## 2024-04-25 PROCEDURE — 97110 THERAPEUTIC EXERCISES: CPT

## 2024-04-25 RX ORDER — METOPROLOL SUCCINATE 50 MG/1
50 TABLET, EXTENDED RELEASE ORAL DAILY
Qty: 30 TABLET | Refills: 3 | Status: SHIPPED | OUTPATIENT
Start: 2024-04-26

## 2024-04-25 RX ORDER — CEPHALEXIN 500 MG/1
500 CAPSULE ORAL 2 TIMES DAILY
Qty: 20 CAPSULE | Refills: 0 | Status: SHIPPED | OUTPATIENT
Start: 2024-04-25 | End: 2024-05-05

## 2024-04-25 RX ORDER — HYDROCODONE BITARTRATE AND ACETAMINOPHEN 5; 325 MG/1; MG/1
1 TABLET ORAL EVERY 8 HOURS PRN
Qty: 15 TABLET | Refills: 0 | Status: SHIPPED | OUTPATIENT
Start: 2024-04-25 | End: 2024-04-30

## 2024-04-25 RX ORDER — CALCIUM CARBONATE 500 MG/1
500 TABLET, CHEWABLE ORAL 3 TIMES DAILY PRN
Qty: 90 TABLET | Refills: 0 | Status: SHIPPED | OUTPATIENT
Start: 2024-04-25 | End: 2024-05-25

## 2024-04-25 RX ORDER — SPIRONOLACTONE 25 MG/1
25 TABLET ORAL DAILY
Qty: 30 TABLET | Refills: 3 | Status: SHIPPED | OUTPATIENT
Start: 2024-04-26

## 2024-04-25 RX ADMIN — SACUBITRIL AND VALSARTAN 1 TABLET: 97; 103 TABLET, FILM COATED ORAL at 10:07

## 2024-04-25 RX ADMIN — SPIRONOLACTONE 25 MG: 25 TABLET ORAL at 10:07

## 2024-04-25 RX ADMIN — METOPROLOL SUCCINATE 50 MG: 50 TABLET, EXTENDED RELEASE ORAL at 10:07

## 2024-04-25 RX ADMIN — ASPIRIN 81 MG CHEWABLE TABLET 81 MG: 81 TABLET CHEWABLE at 10:07

## 2024-04-25 RX ADMIN — ENOXAPARIN SODIUM 60 MG: 100 INJECTION SUBCUTANEOUS at 10:06

## 2024-04-25 RX ADMIN — ATORVASTATIN CALCIUM 80 MG: 80 TABLET, FILM COATED ORAL at 10:07

## 2024-04-25 RX ADMIN — HYDROCODONE BITARTRATE AND ACETAMINOPHEN 1 TABLET: 5; 325 TABLET ORAL at 06:11

## 2024-04-25 RX ADMIN — EMPAGLIFLOZIN 10 MG: 10 TABLET, FILM COATED ORAL at 10:07

## 2024-04-25 RX ADMIN — SODIUM CHLORIDE, PRESERVATIVE FREE 10 ML: 5 INJECTION INTRAVENOUS at 10:07

## 2024-04-25 ASSESSMENT — PAIN DESCRIPTION - ORIENTATION: ORIENTATION: MID;LOWER

## 2024-04-25 ASSESSMENT — PAIN DESCRIPTION - LOCATION: LOCATION: BACK

## 2024-04-25 ASSESSMENT — ENCOUNTER SYMPTOMS
RHINORRHEA: 0
CONSTIPATION: 0
VOMITING: 0
COUGH: 0
NAUSEA: 0
CHEST TIGHTNESS: 0
ABDOMINAL PAIN: 0
DIARRHEA: 0
SHORTNESS OF BREATH: 0
WHEEZING: 0
BLOOD IN STOOL: 0

## 2024-04-25 ASSESSMENT — PAIN - FUNCTIONAL ASSESSMENT
PAIN_FUNCTIONAL_ASSESSMENT: PREVENTS OR INTERFERES SOME ACTIVE ACTIVITIES AND ADLS
PAIN_FUNCTIONAL_ASSESSMENT: 0-10

## 2024-04-25 ASSESSMENT — PAIN SCALES - GENERAL
PAINLEVEL_OUTOF10: 7
PAINLEVEL_OUTOF10: 5
PAINLEVEL_OUTOF10: 0
PAINLEVEL_OUTOF10: 8
PAINLEVEL_OUTOF10: 5

## 2024-04-25 ASSESSMENT — PAIN DESCRIPTION - PAIN TYPE
TYPE: ACUTE PAIN
TYPE: CHRONIC PAIN

## 2024-04-25 ASSESSMENT — PAIN DESCRIPTION - FREQUENCY: FREQUENCY: CONTINUOUS

## 2024-04-25 ASSESSMENT — PAIN DESCRIPTION - DESCRIPTORS: DESCRIPTORS: ACHING

## 2024-04-25 ASSESSMENT — PAIN DESCRIPTION - ONSET: ONSET: PROGRESSIVE

## 2024-04-25 NOTE — PROGRESS NOTES
Aleksandr Melendez MD   Urology Progress Note            Subjective: Follow-up urinary retention gross hematuria    Patient Vitals for the past 24 hrs:   BP Temp Temp src Pulse Resp SpO2   04/23/24 0508 124/73 98.1 °F (36.7 °C) -- 97 16 96 %   04/23/24 0014 110/60 98.6 °F (37 °C) -- (!) 106 16 97 %   04/22/24 2011 134/79 98.2 °F (36.8 °C) -- 98 16 98 %   04/22/24 1533 139/89 97.5 °F (36.4 °C) Oral 88 14 100 %   04/22/24 1143 -- -- -- -- 16 --   04/22/24 1113 -- -- -- -- 16 --   04/22/24 1112 118/69 97.9 °F (36.6 °C) Oral (!) 101 15 96 %   04/22/24 1041 -- -- -- -- 18 --   04/22/24 1011 -- -- -- -- 18 --   04/22/24 0800 125/72 98.4 °F (36.9 °C) Oral 92 17 97 %       Intake/Output Summary (Last 24 hours) at 4/23/2024 0744  Last data filed at 4/23/2024 0619  Gross per 24 hour   Intake 1168.2 ml   Output 2200 ml   Net -1031.8 ml       Recent Labs     04/20/24  1200 04/20/24 2028 04/21/24  0501 04/21/24 2039 04/22/24  0454 04/22/24  0752 04/22/24 2004 04/23/24  0451   WBC 15.1*  --  36.5*  --  16.3*  --   --   --    HGB 11.8*   < > 11.8*   < > 9.4* 9.1* 9.3* 9.2*   HCT 36.8   < > 37.2   < > 30.7* 29.6* 29.9* 31.1*   MCV 89.1  --  90.7  --  91.9  --   --   --      --  363  --  277  --   --   --     < > = values in this interval not displayed.     Recent Labs     04/20/24  1200 04/21/24  0501 04/22/24  0752   * 133* 134*   K 4.4 4.8 3.9    98 105   CO2 22 26 21   BUN 40* 35* 26*   CREATININE 1.2* 1.3* 1.2*       Recent Labs     04/20/24  1342   COLORU Yellow   PHUR 7.0   WBCUA 50    RBCUA 10 TO 20   BACTERIA MANY*   SPECGRAV 1.015   LEUKOCYTESUR LARGE*   UROBILINOGEN Normal   BILIRUBINUR NEGATIVE       Additional Lab/culture results:    Physical Exam: Patient alert oriented sitting in bed, patient seen in conjunction with nursing staff, the urine is clear, discussed with the patient catheter removal and void trial    Interval Imaging Findings:    Impression:    Patient 
                                Aleksandr Melendez MD   Urology Progress Note            Subjective: Follow-up urinary retention neurogenic bladder    Patient Vitals for the past 24 hrs:   BP Temp Temp src Pulse Resp SpO2 Weight   04/25/24 0534 -- -- -- -- -- -- 66.9 kg (147 lb 8 oz)   04/25/24 0445 131/83 97.7 °F (36.5 °C) Oral 85 18 -- --   04/24/24 2338 (!) 143/86 98.2 °F (36.8 °C) Oral 90 17 97 % --   04/24/24 2007 (!) 154/98 98.6 °F (37 °C) -- 87 18 99 % --   04/24/24 1803 -- -- -- -- 16 -- --   04/24/24 1733 -- -- -- -- 16 -- --   04/24/24 1540 134/85 98.8 °F (37.1 °C) Oral 88 17 100 % --   04/24/24 1354 139/85 97.7 °F (36.5 °C) Oral 70 18 98 % --   04/24/24 1315 (!) 141/86 -- -- 74 12 100 % --   04/24/24 1300 (!) 143/91 -- -- 75 11 100 % --   04/24/24 1245 127/87 -- -- 76 12 98 % --   04/24/24 1234 120/79 97.7 °F (36.5 °C) Temporal 81 13 98 % --   04/24/24 0755 135/79 98.4 °F (36.9 °C) Oral 85 17 98 % --       Intake/Output Summary (Last 24 hours) at 4/25/2024 0654  Last data filed at 4/25/2024 0035  Gross per 24 hour   Intake 300 ml   Output 725 ml   Net -425 ml       Recent Labs     04/23/24  1254 04/23/24 2017 04/24/24  0524 04/24/24  1521   WBC 12.5*  --  10.3  --    HGB 9.5*  9.5* 9.0* 9.1* 9.9*   HCT 30.1*  30.0* 29.0* 29.2* 32.1*   MCV 91.5  --  92.7  --      --  267  --      Recent Labs     04/22/24  0752 04/24/24  0524   * 134*   K 3.9 4.3    105   CO2 21 22   BUN 26* 26*   CREATININE 1.2* 1.2*       Recent Labs     04/24/24  1230   COLORU Yellow   PHUR 5.5   WBCUA 0 TO 2   RBCUA 0 TO 2   SPECGRAV 1.020   LEUKOCYTESUR TRACE*   UROBILINOGEN Normal   BILIRUBINUR NEGATIVE       Additional Lab/culture results:    Physical Exam: Patient is s/p cystoscopy, demonstrating hemorrhagic cystitis, neurogenic bladder dysfunction, patient failed void trial again yesterday requiring reinsertion of catheter 850 mL residual    Interval Imaging Findings:    Impression:    Patient Active Problem List 
  Physician Progress Note      PATIENT:               LAUREEN KHOURY  CSN #:                  [id number]  :                       1950  ADMIT DATE:       2024 11:43 AM  DISCH DATE:  RESPONDING  PROVIDER #:        Codie Webster MD          QUERY TEXT:    Pt admitted with UTI. Pt noted to have elevated WBC. If possible, please   document in the progress notes and discharge summary if you are evaluating and   /or treating any of the following:    The medical record reflects the following:  Risk Factors: Advanced age, Female  Clinical Indicators: Wbc of 15.1, LA of 2.1, Procal of 0.31, HR in 90s with   high of 103  Treatment: IVF, labs, IV Rocephin    Thank you,  arsen paulino RN  Options provided:  -- Sepsis, present on admission  -- Sepsis was ruled out  -- Other - I will add my own diagnosis  -- Disagree - Not applicable / Not valid  -- Disagree - Clinically unable to determine / Unknown  -- Refer to Clinical Documentation Reviewer    PROVIDER RESPONSE TEXT:    After further study, sepsis was ruled out for this patient.    Query created by: Torri Pride on 2024 6:43 AM      QUERY TEXT:    Pt admitted with UTI.  Pt noted to have meza catheter. If possible, please   document in the progress notes and discharge summary if you are evaluating   and/or treating any of the following:    The medical record reflects the following:  Risk Factors: Advanced age, Female  Clinical Indicators: UTI d/t E. Coli, note of urinary catheter present  Treatment: Catheter removed, Urology consult    Thank you,  arsen PAULINO RN  Options provided:  -- UTI due to chronic indwelling urinary catheter  -- UTI not due to indwelling urinary catheter  -- Other - I will add my own diagnosis  -- Disagree - Not applicable / Not valid  -- Disagree - Clinically unable to determine / Unknown  -- Refer to Clinical Documentation Reviewer    PROVIDER RESPONSE TEXT:    UTI is not due to the indwelling urinary catheter.    Query created by: Shannen 
Attempt to call CT regarding pt ct abd order, no answer at this time.   
Discharge instructions given to pt. All questions answered. Pt's meza bag switched over to leg bag. Demonstrated how to change bag back and forth from leg bag to larger bag. Demonstrated how to empty bag and educated on emptying leg bag more frequently. Pt voices understanding. IV removed with no bleeding from site. Pt discharged home with . All belongings sent with pt.   
Occupational Therapy  [facility]  Occupational Therapy Not Seen    DATE: 2024    NAME: Angelica Harvey  MRN: [id number]   : 1950    Patient not seen this date for Occupational Therapy due to:      [] Cancel by RN or physician due to:    [] Hemodialysis    [] Critical Lab Value Level     [] Blood transfusion in progress    [] Acute or unstable cardiovascular status   _MAP < 55 or more than >115  _HR < 40 or > 130    [] Acute or unstable pulmonary status   -FiO2 > 60%   _RR < 5 or >40    _O2 sats < 85%    [] Strict Bedrest    [] Off Unit for surgery or procedure    [] Off Unit for testing       [] Pending imaging to R/O fracture    [x] Refusal by Patient (Pt refused OT at this time reporting she is \"too cold. And feeling lost.\" Would possibly participate later today).      [] Other      [] OT being discontinued at this time. Patient independent. No further needs.     [] OT being discontinued at this time as the patient has been transferred to hospice care. No further needs.      TARIK COOL   
Occupational Therapy  DATE: 2024    NAME: Angelica Harvey  MRN: [id number]   : 1950    Patient not seen this date for Occupational Therapy due to:      [] Cancel by RN or physician due to:    [] Hemodialysis    [] Critical Lab Value Level     [] Blood transfusion in progress    [] Acute or unstable cardiovascular status   _MAP < 55 or more than >115  _HR < 40 or > 130    [] Acute or unstable pulmonary status   -FiO2 > 60%   _RR < 5 or >40    _O2 sats < 85%    [] Strict Bedrest    [x] Off Unit for surgery or procedure    [] Off Unit for testing       [] Pending imaging to R/O fracture    [] Refusal by Patient      [] Other      [] OT being discontinued at this time. Patient independent. No further needs.     [] OT being discontinued at this time as the patient has been transferred to hospice care. No further needs.      FAHAD SANCHEZ TARIK   
Occupational Therapy  Facility/Department: [facility] PROGRESSIVE CARE  Occupational Therapy Initial Assessment    Name: Angelica Harvey  : 1950  MRN: [id number]  Date of Service: 2024    JANET NOVOA reports patient is medically stable for therapy treatment this date.    Chart reviewed prior to treatment and patient is agreeable for therapy.  All lines intact and patient positioned comfortably at end of treatment.  All patient needs addressed prior to ending therapy session.         Pt currently functioning below baseline.  Recommend daily inpatient skilled therapy at time of discharge to maximize long term outcomes and prevent re-admission. Please refer to AM-PAC score for current level of function.       Discharge Recommendations:  Patient would benefit from continued therapy after discharge  OT Equipment Recommendations  Equipment Needed:  (CTA)       Patient Diagnosis(es): The primary encounter diagnosis was Acute pyelonephritis. Diagnoses of Chest pain, unspecified type and Light headedness were also pertinent to this visit.  Past Medical History:  has a past medical history of Arthritis, Brain aneurysm, CVA (cerebral vascular accident) (HCC), Hypertension, and MI (myocardial infarction) ([facility]).  Past Surgical History:  has a past surgical history that includes brain surgery; joint replacement; and Tubal ligation.      PER H&P:    Angelica Harvey is a 73 y.o. Non- / non  female who presents with Chest Pain (PT states she has been having lower back pain and chest pain for a week. ), Back Pain, and Flank Pain   and is admitted to the hospital for the management of Acute cystitis without hematuria.     Patient has been seen and evaluated by her primary care provider and at a different emergency department for bilateral flank pain ongoing for the past 3 weeks.  She advised that during that same time she has had increased fatigue which has resulted in her being unable to ambulate, complete any ADLs, and she is 
Patient admitted to Med Surg rm 2012. Patient able to ambulate to bed with minimal staff assist. Tele applied and vitals taken.     Patient educated on room, unit, safety instructions, and call light.       Discussed plan of care and patient in agreement.     Patient alert and oriented. Bed alarm set and call light within reach.  
Patient having pain on their back and rates it a 3. Pain interventions includefrequent position changes. Patients goal for pain relief is 1. The need for pain and symptom management will be considered in the discharge planning process to ensure patients comfort.   
Patient refused enema; spouse present. Last BM 4-19-24.   
Patient transferred to room 1008 from [facility]  VS taken, telemetry placed and call light given/within reach.   Patient A&O and given room orientation.   Continous bladder irrigation and IVF running   Bed locked and lowest position  Bed alarm on for safety and non skid footwear placed on pt    Care on going   
Per Dr. Melendez meza catheter was removed and bladder irrigation was discontinued. Pt educated the importance of needing to urinate within 6 hours. Will bladder scan at noon per order. New brief placed. Pt was calm and cooperative during meza removal.  Care ongoing   
Physical Therapy  DATE: 2024    NAME: Angelica Harvey  MRN: [id number]   : 1950    Patient not seen this date for Physical Therapy due to:      [] Cancel by RN or physician due to:    [] Hemodialysis    [] Critical Lab Value Level     [] Blood transfusion in progress    [] Acute or unstable cardiovascular status   _MAP < 55 or more than >115  _HR < 40 or > 130    [] Acute or unstable pulmonary status   -FiO2 > 60%   _RR < 5 or >40    _O2 sats < 85%    [] Strict Bedrest    [] Off Unit for surgery or procedure    [] Off Unit for testing       [] Pending imaging to R/O fracture    [] Refusal by Patient      [x] Other Preparing to go for cysto. , will check back as time allows      [] PT being discontinued at this time. Patient independent. No further needs.     [] PT being discontinued at this time as the patient has been transferred to hospice care. No further needs.      SUDEEP MARINELLI, PTA    
Physical Therapy  Facility/Department: [facility] PROGRESSIVE CARE  Rehabilitation Physical Therapy Treatment Note    NAME: Angelica Harvey  : 1950 (73 y.o.)  MRN: [id number]  CODE STATUS: Full Code    Date of Service: 24       Restrictions:  Restrictions/Precautions: General Precautions, Fall Risk  Position Activity Restriction  Other position/activity restrictions: Up wtih assist, telemetty, RUE IV, urinary catheter, alarms, monitor BP(+) orthostatics     SUBJECTIVE  Subjective  Subjective: pt in bed upon arrival agreeable to PT               OBJECTIVE  Cognition  Overall Cognitive Status: Exceptions  Arousal/Alertness: Delayed responses to stimuli  Following Commands: Follows multistep commands with increased time;Follows multistep commands with repitition  Attention Span: Appears intact  Memory: Decreased short term memory  Safety Judgement: Decreased awareness of need for safety;Decreased awareness of need for assistance  Problem Solving: Assistance required to generate solutions;Assistance required to correct errors made;Assistance required to implement solutions  Insights: Decreased awareness of deficits  Initiation: Requires cues for some  Sequencing: Requires cues for some  Cognition Comment: Pt. displayed increased motivation today.  Orientation  Overall Orientation Status: Within Functional Limits  Orientation Level: Oriented X4    Functional Mobility  Bed Mobility  Overall Assistance Level: Contact Guard Assist  Bridging  Assistance Level: Contact guard assist  Roll Left  Assistance Level: Contact guard assist  Supine to Sit  Assistance Level: Minimal assistance;Moderate assistance;Requires x 2 assistance  Scooting  Assistance Level: Minimal assistance;Moderate assistance;Requires x 2 assistance  Transfers  Surface: From bed;To chair with arms  Additional Factors: Hand placement cues;Set-up;Verbal cues;With handrails  Device: Walker  Sit to Stand  Assistance Level: Moderate assistance;Requires x 2 
[facility]  Office: [phone]  Otto Lewis DO, Stew Jasmine DO, Harlan Richter DO, Steve Keith, DO, Sky Quintero MD, Denise Randall MD, Codie Webster MD, Joy Du MD,  Dale Clemente MD, Bernardo Adorno MD, Charlotte Yepez MD,  Moris Tuttle DO, Mart Choi MD, Guy Bates MD, Tim Lewis DO, Daysi Brush MD,  Ciro Valenzuela DO, Irene Huff MD, Jocelyne Mcneill MD, Tania Blair MD, Susan Barrientos MD,  James Gonzales MD, Eris Esparza MD, Sol Perdomo MD, Fritz De La Rosa MD, Jerald Lee MD, Natalia Armendariz MD, Rambo Kat DO, Bernardino Doty DO, Smith Baker MD,  Loc Bui MD, Shirley Waterhouse, CNP,  Rossana Leavitt CNP, Paulie Garcia, CNP,  Maryam Shetty, FAUSTO, Rolanda Portillo, CNP, Jordyn Pride, CNP, Maura Sanchez CNP, Rama Valladares, CNP, Maria Isabel Fletcher, PA-C, Jackie Holder PA-C, Angela Martin, CNP, Taylor Tristan, CNP, Meka Nina, CNP, Gloria Dominguez, CNP, Larisa Snell, CNP, Yolanda Conley, CNS, Ivania Siu, CNP, Barbara Cordero CNP, Tracy Schwab, CNP       [facility]      Daily Progress Note     Admit Date: 4/20/2024  Bed/Room No.  1008/1008-02  Admitting Physician : Codie Webster MD  Code Status :Full Code  Hospital Day:  LOS: 4 days   Chief Complaint:     Chief Complaint   Patient presents with    Chest Pain     PT states she has been having lower back pain and chest pain for a week.     Back Pain    Flank Pain     Principal Problem:    Acute cystitis without hematuria  Active Problems:    Cerebrovascular accident (CVA) (HCC)    Dyslipidemia    Coronary artery disease involving native coronary artery of native heart without angina pectoris    GERD (gastroesophageal reflux disease)    Essential hypertension    Heart failure with reduced ejection fraction (HCC)    Gross hematuria    Urinary retention    Bilateral hydronephrosis    Hx of TIA (transient ischemic attack) and stroke    Hx of cardiac arrest    Acute 
Pt stated she was feeling confused, tried to make a call to her  and just wasnt able to finish with the call. States hard time dialing phone and thinking. Charge nurse and writer came in to assess pt and no stroke sysmptoms noted besides very slight leg drift on both legs. Vitals stable and blood sugar stable, see chart. Pt did get percocet a little after 0800 this morning.  Attending notified and to hold pain meds, no further orders. Care ongoing.       
[facility]  Office: [phone]  Otto Lewis DO, Stew Jasmine DO, Harlan Richter DO, Steve Keith DO, Sky Quintero MD, Denise Randall MD, Codie Webster MD, Joy Du MD,  Dale Clemente MD, Bernardo Adorno MD, Charlotte Yepez MD,  Moris Tuttle DO, Mart Choi MD, Guy Bates MD, Tim Lewis DO, Daysi Brush MD,  Ciro Valenzuela DO, Irene Huff MD, Jocelyne Mcneill MD, Tania Blair MD, Susan Barrientos MD,  James Gonzales MD, Eris Esparza MD, Sol Perdomo MD, Fritz De La Rosa MD, Jerald Lee MD, Natalia Armendariz MD, Rambo Kat DO, Bernardino Doty DO, Smith Baker MD,  Loc Bui MD, Shirley Waterhouse, CNP,  Rossana Leavitt CNP, Paulie Garcia, CNP,  Maryam Shetty, DNP, Rolanda Portillo, CNP, Jordyn Pride, CNP, Maura Sanchez CNP, Rama Valladares CNP, Maria Isabel Fletcher PAMaxC, Jackie Holder PA-C, Angela Martin, CNP, Taylor Tristan, CNP, Meka Nina, CNP, Gloria Dominguez, CNP, Larisa Snell, CNP, Yolanda Conley, CNS, Ivania Siu, CNP, Barbara Cordero CNP, Tracy Schwab, CNP         [facility]   IN-PATIENT SERVICE   [facility]    Progress Note    4/21/2024    11:34 AM    Name:   Angelica Harvey  MRN:     [id number]     Acct:      [id number]   Room:   2012/2012-02  IP Day:  1  Admit Date:  4/20/2024 11:43 AM    PCP:   Ciro Burns PA-C  Code Status:  Full Code    Subjective:     C/C:   Chief Complaint   Patient presents with    Chest Pain     PT states she has been having lower back pain and chest pain for a week.     Back Pain    Flank Pain     Interval History Status: Mixed.     Condition mixed.  She feels better with more energy but continues to endorse bilateral flank pain.  CT abdomen pelvis reveals bladder hematoma with likely clots in the bladder, and significant constipation.  White count also sharply increased.  Urology placed three-way irrigation and requested discontinuation of antiplatelets and anticoagulation.  I will be 
The patient returned to the room from CT department.  
The patient returned to the room from CT department.   
The patient was taken to the CT department in wheelchair for testing.   
The patient was transferred to CT department in wheelchair for further testing at this time. No distress. The patient's  is present.   
Transitions of Care Pharmacy Service   Medication Review    The patient's list of current home medications has been reviewed.     Source(s) of information: Patient, discharge summary from [facility] 1/24/24, Phillip    Based on information provided by the above source(s), I have updated the patient's home med list.     Other Notes Pantoprazole was on med list from [facility] but patient doesn't take  She gets Farxiga and Entresto via patient assistance  I am unsure where she fills atorvastatin but she confirms she takes it as shown on d/c summary from [facility]           Please feel free to call me with any questions about this encounter. Thank you.    Lissy Dominguez RPH   Transitions of Care Pharmacy Service  Phone:  [phone]  Fax: [phone]      Electronically signed by Lissy Dominguez RPH on 4/20/2024 at 3:03 PM     No current facility-administered medications on file prior to encounter.     Current Outpatient Medications on File Prior to Encounter   Medication Sig Dispense Refill    atorvastatin (LIPITOR) 80 MG tablet Take 1 tablet by mouth daily      sacubitril-valsartan (ENTRESTO)  MG per tablet Take 1 tablet by mouth 2 times daily      NIFEdipine (PROCARDIA XL) 90 MG extended release tablet Take 1 tablet by mouth daily      dapagliflozin (FARXIGA) 10 MG tablet Take 1 tablet by mouth daily      aspirin 81 MG chewable tablet Take 1 tablet by mouth daily             For Pharmacy Admin Tracking Only  # meds added to list: 2  # meds removed from list: 6  # meds adjusted on list (dose/frequency/formulation): 0    
Writer with Dr. Melendez during rounds. Orders to discontinue bladder irrigation, remove meza, give 1x dose of Flomax 0.4 mg, and bladder scan patient at noon and call him. Plan discussed with pt at bedside.   Care ongoing.   
noted to have intermittent carotid and cerebral artery disease with moderate stenosis of proximal PCA bilaterally.   She had mitral clip in July 2023.     Evaluation emergency room showed leukocytosis, elevated creatinine 1.2, BUN 40, elevated proBNP 1942.  UA showed large leukoesterase many bacteria, 10-20 RBCs.  Chest x-ray showed widened mediastinum.  CT abdomen pelvis without contrast showed marked distention of bladder, moderate bilateral hydronephrosis with possible bladder outlet obstruction, hyperdense material in gallbladder concerning for sludge, moderate to large volume stool burden in colon, circumferential thickening of distal esophagus suggestive of esophagitis, moderate hiatal hernia.  CTA chest with contrast was performed and did not show any aortic aneurysm or dissection.  Patient has diffuse atherosclerosis.  Fluid-filled esophagus likely from GERD was noted.  Urine culture showed E. Coli > 100,000 CFU/ml.  PMH:  Past Medical History:   Diagnosis Date    Arthritis     Brain aneurysm 1998,2014    CVA (cerebral vascular accident) ([facility])     Hypertension     MI (myocardial infarction) ([facility])       Allergies: No Known Allergies   Medications :  metoprolol succinate, 50 mg, Oral, Daily  spironolactone, 25 mg, Oral, Daily  sodium chloride flush, 5-40 mL, IntraVENous, 2 times per day  enoxaparin, 1 mg/kg, SubCUTAneous, BID  [MAR Hold] cefTRIAXone (ROCEPHIN) IV, 1,000 mg, IntraVENous, Q24H  aspirin, 81 mg, Oral, Daily  atorvastatin, 80 mg, Oral, Daily  empagliflozin, 10 mg, Oral, Daily  sacubitril-valsartan, 1 tablet, Oral, BID  [Held by provider] hydrALAZINE, 50 mg, Oral, 3 times per day        Review of Systems   Review of Systems   Constitutional:  Positive for fatigue. Negative for appetite change, chills, fever and unexpected weight change.   HENT:  Negative for congestion, rhinorrhea and sneezing.    Eyes:  Negative for visual disturbance.   Respiratory:  Negative for cough, chest tightness, shortness 
functional tasks.  Short Term Goal 3: UB ADL to set up and LB ADL to mod assist of 1 and with use of AD/AE as needed.  Short Term Goal 4: ADL transfers and functional mob with AD to mod assist of 1.  Short Term Goal 5: toileting tasks with use of grab bar/AD and BSC as needed to mod assist of 1.  Additional Goals?: No  Long Term Goals  Long Term Goal 1: Pt to stand with min assist of 1 and AD alicja > 6 mins as able to reduce falls in function.  Long Term Goal 2: Pt/caregivers to be I with pressure relief, EC/WS and fall prevention tech, BUE HEP, and DME/AE recommendations with use of handouts.    AM-PAC Score        AM-PAC Inpatient Daily Activity Raw Score: 12 (04/25/24 1119)  AM-PAC Inpatient ADL T-Scale Score : 30.6 (04/25/24 1119)  ADL Inpatient CMS 0-100% Score: 66.57 (04/25/24 1119)  ADL Inpatient CMS G-Code Modifier : CL (04/25/24 1119)      Therapy Time   Individual Concurrent Group Co-treatment   Time In       0928   Time Out       1008   Minutes       40    Co-treatment with PT warranted secondary to decreased safety and independence requiring 2 skilled therapy professionals to address individual discipline's goals. OT addressing preparation for ADL transfer, sitting balance for increased ADL performance, sitting/activity tolerance, functional reaching, environmental safety/scanning, fall prevention, functional mobility for ADL transfers, ability to sequence and follow directions, bed mobility tech, and functional UE strength.      Upon Co-Signature of this note, appropriate supervision of TARIK has been delivered with regards to plan of care, evaluation/re-evaluation findings, any appropriate modifications to treatment plan, and relevant discharge planning. Instructions and training unique to this patient and this practitioner have been considered and implemented.        TARIK CUELLAR     
Assistance;Maximum Assistance;2 Person Assistance  Stand to Sit: Moderate Assistance;Maximum Assistance;2 Person Assistance  Comment: MOD VC + tactile assist on correct use of upper body for safe sit/stand, nose over toes tech, upright posture, pacing + to back all way to surface with walker CLOSE until she feels touch behind legs, & to ensure she reaches with UB support to arms of chair  AND to slow down & take time for transitions to make sure she has no feeling of unsteadiness or dizziness, to INC SAFETY/REDUCE FALL RISK  Ambulation  Surface: Level tile  Assistance: Moderate assistance;2 Person assistance  Quality of Gait: short and shuffle steps with LOW toe clearance with MAX cues to keep walker close and amb inside base of walker & with upright posture for safety/scanning, slowing down to inc safety especially with turning for safe maneuvering of R/walker, and for safe mgt of lines to reduce fall risk, and for proper pacing     Balance  Posture: Fair  Sitting - Static: Good  Sitting - Dynamic: Good;-  Standing - Static: Fair (R/W)  Standing - Dynamic: Fair;- (R/W)  Comments: Pt demonstrated posterior LOB upon standing, needed MOD cues & ModA to bring wt forward to establish safe COG  Circulation/Endurance Exercises: ankle pumps x 20  Functional Mobility Circuit Training: sit to stands x 4 with Mod to MaxA  Static Sitting Balance Exercises: seated EOB with Rcih foot placement x 10 minutes with CGA  Dynamic Sitting Balance Exercises: UE reaches from seated height to MIDSHIN with CGA  Static Standing Balance Exercises: Pt stood 2 minutes with R/W and Ana(completed x 2)  Dynamic Standing Balance Exercises: Pt completed static standing WS x 6 with UB support at R/walker and Ana, and standing mini marching x 3 each leg with UB support at R/walker and HIGH GUARD  Postural Correction Exercises: Upright posture  Breathing Techniques: pursed lip breathing techniques & deep breathing with incentive spirometer including 
be maintained on aspirin 81 mg but appears has been taking total 650 mg daily. Recent OSH CT report with left ventricular apical thinning and hyperenhancement, possible sequela of MI versus mild volume thrombus? Otherwise, she is neurologically stable at baseline. TTE this admission with no obvious thrombus or mass.      Hx of recent TIA, prior hx of stroke, cerebral aneurysm s/p coiling  Hx of cardiac arrest (2019)  Hx of seizures per chart review but no seizures or seizure like activity reported per patient and family     Plan:      Cardiology consulted re OSH CT imaging findings of possible ventricular thrombus. TTE with no obvious thrombus or mass. Will defer further mgmt to primary and cardio teams.  From a neuro standpoint, would recommend continuing asa 81 mg when safe from bleeding standpoint. Patient and  counseled to avoid higher doses given no reported indication and now with bleeding.  Continue home statin.  Hx of seizures per chart review but none clinically per patient and . Recent OSH EEG with right temporal sharp waves. Will defer ASM for now given no prior seizure hx.  Rest of care per primary team.  Follow up with neurology outpatient in 4-6 weeks.     No further inpatient neurological workup indicated at this time.  We will sign off for now.  Please call if any further questions.     Above discussed with primary attending, Dr. Webster.      Electronically signed by Berta Torres DO on 4/23/2024 at 10:05 AM      Berta Torres DO  [facility] [place]  Neurology    
Please correlate with   clinical symptoms of esophagitis.   5. Small-to-moderate hiatal hernia.         XR CHEST PORTABLE   Preliminary Result   1. Widening of the upper mediastinum.  Differential includes ascending   thoracic aortic aneurysm and aortic tortuosity.  Recommend CT of the chest   with contrast, if there is no contraindication, for further evaluation.   2. No acute pulmonary abnormality.              Clinical Course : gradually improving  Assessment and Plan  :        Acute cystitis with Pyelonephritis   Rocephin - follow C/S   Gross hematuria with bladder outlet obstruction and obstruction , bilateral hydronephrosis  Hold aspirin .   Three-way catheter placed by urology.  Patient on continuous bladder irrigation. Monitor HGB and kidney function.   H/O Stroke in Jan 2024  -had cerebral aneurysm -controlled blood pressure.  H/O syncope few days before. Get limited echo, consult cardiology given Heart disease and possible LV clot.   Essential Hypertension -continue nifedipine, clonidine .       Continue to monitor vitals , Intake / output ,  Cell count , HGB , Kidney function, oxygenation  as indicated .   Plan and updates discussed with patient ,  answers  explained to satisfaction.   Plan discussed with Staff Valeriano GONZALES     (Please note that portions of this note were completed with a voice recognition program. Efforts were made to edit the dictations but occasionally words are mis-transcribed.)      Codie Webster MD  4/22/2024    
function.   H/O Stroke in Jan 2024  -had cerebral aneurysm -controlled blood pressure. Resume  Aspirin 81 mg daily when ok with Urology. Plan for Cystoscopy at some point .   H/O syncope few days before.  Limited echocardiogram performed showed preserved ejection fraction, no thrombus or mass seen.  Likely secondary to orthostatic hypotension.  Blood pressure dropped from 127 to 91 mmHg on standing.  Place lower extremity compression stocking.  Hold hydralazine.  Adjust clonidine, clonidine for BP control.  Essential Hypertension -discontinued nifedipine due to orthostatic hypotension.  Hold hydralazine.  Adjust clonidine and Coreg.    PT OT evaluation.  Discharge planning home with home care.  Urine culture surgery pending.    Continue to monitor vitals , Intake / output ,  Cell count , HGB , Kidney function, oxygenation  as indicated .   Plan and updates discussed with patient ,  answers  explained to satisfaction.   Plan discussed with Staff Jeni GONZALES     (Please note that portions of this note were completed with a voice recognition program. Efforts were made to edit the dictations but occasionally words are mis-transcribed.)      Codie Webster MD  4/23/2024

## 2024-04-25 NOTE — PLAN OF CARE
Vitals stable and pt received PRN Norco fpr pain. Pt satisfied.   Bladder scanned post void per Dr. Melendez order. Bladder scan shower 850. Lawrence reinserted. Adequate urine output since insertion. Urine is clear and yellow.  She remains A/O x4 and on room air.   She has remained free from falls.   All needs met at this time.      Problem: Discharge Planning  Goal: Discharge to home or other facility with appropriate resources  4/25/2024 0335 by Jaquelin Simmons RN  Outcome: Progressing  Flowsheets (Taken 4/24/2024 2016)  Discharge to home or other facility with appropriate resources: Identify barriers to discharge with patient and caregiver     Problem: Safety - Adult  Goal: Free from fall injury  4/25/2024 0335 by Jaquelin Simmons RN  Outcome: Progressing     Problem: Chronic Conditions and Co-morbidities  Goal: Patient's chronic conditions and co-morbidity symptoms are monitored and maintained or improved  4/25/2024 0335 by Jaquelin Simmons RN  Outcome: Progressing  Flowsheets (Taken 4/24/2024 2016)  Care Plan - Patient's Chronic Conditions and Co-Morbidity Symptoms are Monitored and Maintained or Improved: Monitor and assess patient's chronic conditions and comorbid symptoms for stability, deterioration, or improvement     Problem: Skin/Tissue Integrity - Adult  Goal: Skin integrity remains intact  4/25/2024 0335 by Jaquelin Simmons RN  Outcome: Progressing     Problem: Musculoskeletal - Adult  Goal: Return mobility to safest level of function  4/25/2024 0335 by Jaquelin Simmons RN  Outcome: Progressing  Flowsheets (Taken 4/24/2024 2016)  Return Mobility to Safest Level of Function: Assess patient stability and activity tolerance for standing, transferring and ambulating with or without assistive devices     Problem: Hematologic - Adult  Goal: Maintains hematologic stability  4/25/2024 0335 by Jaquelin Simmons RN  Outcome: Progressing  Flowsheets (Taken 4/24/2024 2016)  Maintains hematologic stability: Assess for signs and symptoms

## 2024-04-25 NOTE — PLAN OF CARE
Pt alert and oriented. Aware of discharge plans today. Pt unable to reach  to pick her up. Writer also unable to reach . Pt is visibly worried and states she feels sick to her stomach from worry. After multiple attempts to reach him, Clin lead notified [facility] for wellness check.  located and called back. Pt aware and ready for discharge.       Problem: Chronic Conditions and Co-morbidities  Goal: Patient's chronic conditions and co-morbidity symptoms are monitored and maintained or improved  4/25/2024 1345 by Farrah Sinha, RN  Outcome: Progressing     Problem: Pain  Goal: Verbalizes/displays adequate comfort level or baseline comfort level  4/25/2024 1345 by Farrah Sinha, RN  Outcome: Progressing

## 2024-04-25 NOTE — PLAN OF CARE
Problem: Discharge Planning  Goal: Discharge to home or other facility with appropriate resources  4/25/2024 1501 by Farrah Sinha RN  Outcome: Completed

## 2024-04-26 NOTE — ED NOTES
Pt provided discharge paperwork.  Pt instructed to return to ED with urinary retention, abdominal pain, abdominal distention, nausea, vomiting, diarrhea, fever, chills, or any other new complaint.  Pt instructed to return to ED with new / worsened symptoms.  Pt instructed to call PCP and urology first thing tomorrow morning for follow up.  Pt verbalized understanding and denies additional questions or concerns at this time. Pt ambulatory out of ED with walker

## 2024-04-26 NOTE — DISCHARGE INSTRUCTIONS
Follow up with doctors in morning.  It was recommended to keep Lawrence catheter in until you see your urologist but you have declined.  If any trouble with urination arises please return to ER immediately and catheter may need to be reinserted.  Return to ER immediately if symptoms worsen or persist.       Crescentic Advancement Flap Text: The defect edges were debeveled with a #15 scalpel blade. Given the location of the defect and the proximity to free margins a crescentic advancement flap was deemed most appropriate. Using a sterile surgical marker, the appropriate advancement flap was drawn incorporating the defect and placing the expected incisions within the relaxed skin tension lines where possible. The area thus outlined was incised deep to adipose tissue with a #15 scalpel blade. The skin margins were undermined to an appropriate distance in all directions utilizing iris scissors. Following this, the designed flap was advanced and carried over into the primary defect and sutured into place.

## 2024-04-26 NOTE — DISCHARGE SUMMARY
Further Evaluation/Follow Up POST HOSPITALIZATION/Incidental Findings: Follow-up with urology.    Diet: cardiac diet    Activity: As tolerated.    Instructions to Patient: Return if symptoms worsen.    Discharge Medications:      Medication List        START taking these medications      calcium carbonate 500 MG chewable tablet  Commonly known as: TUMS  Take 1 tablet by mouth 3 times daily as needed for Heartburn     cephALEXin 500 MG capsule  Commonly known as: KEFLEX  Take 1 capsule by mouth 2 times daily for 10 days     HYDROcodone-acetaminophen 5-325 MG per tablet  Commonly known as: Norco  Take 1 tablet by mouth every 8 hours as needed for Pain for up to 5 days. Intended supply: 3 days. Take lowest dose possible to manage pain Max Daily Amount: 3 tablets     JOBST KNEE HIGH COMPRESSION SM Misc  1 each by Does not apply route daily     metoprolol succinate 50 MG extended release tablet  Commonly known as: TOPROL XL  Take 1 tablet by mouth daily     spironolactone 25 MG tablet  Commonly known as: ALDACTONE  Take 1 tablet by mouth daily            CONTINUE taking these medications      aspirin 81 MG chewable tablet     atorvastatin 80 MG tablet  Commonly known as: LIPITOR     dapagliflozin 10 MG tablet  Commonly known as: FARXIGA     sacubitril-valsartan  MG per tablet  Commonly known as: ENTRESTO            STOP taking these medications      NIFEdipine 90 MG extended release tablet  Commonly known as: PROCARDIA XL               Where to Get Your Medications        These medications were sent to [facility] [id number] [place] [id number] - P [phone] - F [phone] [address] [facility] [facility] [address]      Phone: [phone]   calcium carbonate 500 MG chewable tablet  cephALEXin 500 MG capsule  HYDROcodone-acetaminophen 5-325 MG per tablet  JOBST KNEE HIGH COMPRESSION SM Misc  metoprolol succinate 50 MG extended release tablet  spironolactone 25 MG tablet         Time Spent on

## 2024-04-26 NOTE — ED NOTES
Pt to ED requesting meza catheter be removed.  Pt reports that catheter was placed yesterday post op.  Pt reports irritation d/t the catheter and states that she was pulling on the catheter at home trying to get it out.  Pt denies urinary pain at this time.  Denies NVD, fever, chills.

## 2024-04-26 NOTE — ED PROVIDER NOTES
[facility] ED  eMERGENCY dEPARTMENTeNCOUnter      Pt Name: Angelica Harvey  MRN: [id number]  Birthdate 1950  Date ofevaluation: 4/25/2024  Provider: Arash Canas PA-C    CHIEF COMPLAINT       Chief Complaint   Patient presents with    Check-Up     States that she has a catheter and she wants it out. Just discharged today         HISTORY OF PRESENT ILLNESS  (Location/Symptom, Timing/Onset, Context/Setting, Quality, Duration, Modifying Factors, Severity.)   Angelica Harvey is a 73 y.o. female who presents to the emergency department with the to have a Lawrence catheter removed.  Patient was discharged from the hospital today after being admitted for bladder irrigation and cystoscopy.  Risk and benefits for keeping the Lawrence catheter versus taken out were discussed at length.  Patient is aware that she may need to return if she cannot void her bladder with urination.      Nursing Notes were reviewed.    ALLERGIES     Patient has no known allergies.    CURRENT MEDICATIONS       Previous Medications    ASPIRIN 81 MG CHEWABLE TABLET    Take 1 tablet by mouth daily    ATORVASTATIN (LIPITOR) 80 MG TABLET    Take 1 tablet by mouth daily    CALCIUM CARBONATE (TUMS) 500 MG CHEWABLE TABLET    Take 1 tablet by mouth 3 times daily as needed for Heartburn    CEPHALEXIN (KEFLEX) 500 MG CAPSULE    Take 1 capsule by mouth 2 times daily for 10 days    DAPAGLIFLOZIN (FARXIGA) 10 MG TABLET    Take 1 tablet by mouth daily    ELASTIC BANDAGES & SUPPORTS (JOBST KNEE HIGH COMPRESSION SM) MISC    1 each by Does not apply route daily    HYDROCODONE-ACETAMINOPHEN (NORCO) 5-325 MG PER TABLET    Take 1 tablet by mouth every 8 hours as needed for Pain for up to 5 days. Intended supply: 3 days. Take lowest dose possible to manage pain Max Daily Amount: 3 tablets    METOPROLOL SUCCINATE (TOPROL XL) 50 MG EXTENDED RELEASE TABLET    Take 1 tablet by mouth daily    SACUBITRIL-VALSARTAN (ENTRESTO)  MG PER TABLET    Take 1 tablet by

## 2024-04-30 ENCOUNTER — TELEPHONE (OUTPATIENT)
Dept: NEUROLOGY | Age: 74
End: 2024-04-30

## 2024-05-03 LAB — UROTHELIAL CANCER DETECTION: NORMAL

## 2024-08-14 ENCOUNTER — HOSPITAL ENCOUNTER (EMERGENCY)
Age: 74
Discharge: HOME | End: 2024-08-14
Attending: EMERGENCY MEDICINE
Payer: MEDICARE

## 2024-08-14 VITALS
SYSTOLIC BLOOD PRESSURE: 144 MMHG | OXYGEN SATURATION: 100 % | BODY MASS INDEX: 20.5 KG/M2 | HEART RATE: 97 BPM | TEMPERATURE: 98.6 F | WEIGHT: 140 LBS | RESPIRATION RATE: 18 BRPM | DIASTOLIC BLOOD PRESSURE: 94 MMHG

## 2024-08-14 DIAGNOSIS — B02.9 HERPES ZOSTER WITHOUT COMPLICATION: Primary | ICD-10-CM

## 2024-08-14 PROCEDURE — 99283 EMERGENCY DEPT VISIT LOW MDM: CPT

## 2024-08-14 PROCEDURE — 6370000000 HC RX 637 (ALT 250 FOR IP): Performed by: NURSE PRACTITIONER

## 2024-08-14 RX ORDER — HYDROCODONE BITARTRATE AND ACETAMINOPHEN 5; 325 MG/1; MG/1
1 TABLET ORAL EVERY 8 HOURS PRN
Qty: 12 TABLET | Refills: 0 | Status: SHIPPED | OUTPATIENT
Start: 2024-08-14 | End: 2024-08-18

## 2024-08-14 RX ORDER — HYDROCODONE BITARTRATE AND ACETAMINOPHEN 5; 325 MG/1; MG/1
1 TABLET ORAL ONCE
Status: COMPLETED | OUTPATIENT
Start: 2024-08-14 | End: 2024-08-14

## 2024-08-14 RX ORDER — ACYCLOVIR 800 MG/1
800 TABLET ORAL
Qty: 50 TABLET | Refills: 0 | Status: SHIPPED | OUTPATIENT
Start: 2024-08-14 | End: 2024-08-24

## 2024-08-14 RX ORDER — ACYCLOVIR 200 MG/1
800 CAPSULE ORAL ONCE
Status: COMPLETED | OUTPATIENT
Start: 2024-08-14 | End: 2024-08-14

## 2024-08-14 RX ADMIN — ACYCLOVIR 800 MG: 200 CAPSULE ORAL at 15:29

## 2024-08-14 RX ADMIN — HYDROCODONE BITARTRATE AND ACETAMINOPHEN 1 TABLET: 5; 325 TABLET ORAL at 15:29

## 2024-08-14 ASSESSMENT — PAIN DESCRIPTION - LOCATION
LOCATION: BACK;BREAST;CHEST
LOCATION: CHEST

## 2024-08-14 ASSESSMENT — PAIN SCALES - GENERAL
PAINLEVEL_OUTOF10: 10
PAINLEVEL_OUTOF10: 10

## 2024-08-14 ASSESSMENT — PAIN DESCRIPTION - DESCRIPTORS
DESCRIPTORS: ACHING;GNAWING;SHARP
DESCRIPTORS: BURNING

## 2024-08-14 ASSESSMENT — PAIN DESCRIPTION - ORIENTATION
ORIENTATION: LEFT
ORIENTATION: LEFT;MID

## 2024-08-14 ASSESSMENT — PAIN DESCRIPTION - FREQUENCY: FREQUENCY: CONTINUOUS

## 2024-08-14 ASSESSMENT — ENCOUNTER SYMPTOMS
BACK PAIN: 0
SHORTNESS OF BREATH: 0

## 2024-08-14 ASSESSMENT — PAIN - FUNCTIONAL ASSESSMENT: PAIN_FUNCTIONAL_ASSESSMENT: 0-10

## 2024-08-14 NOTE — DISCHARGE INSTRUCTIONS
Norco:  WARNING:  May cause drowsiness.  May impair ability to operate vehicles or machinery.  Do not use in combination with alcohol.

## 2024-08-14 NOTE — ED NOTES
Patient presents with c/o rash which starts on anterior trunk under left breast and wraps around posteriorly to her back which first presented yesterday. Rash is reddened and painful.

## 2024-08-14 NOTE — ED PROVIDER NOTES
[facility] ED  eMERGENCY dEPARTMENT eNCOUnter      Pt Name: Angelica Harvey  MRN: [id number]  Birthdate 1950  Date of evaluation: 8/14/2024  Provider: TOBI Aguilar CNP    CHIEF COMPLAINT       Chief Complaint   Patient presents with    Rash     Under left breat to left side of back since yesterday         HISTORY OF PRESENT ILLNESS  (Location/Symptom, Timing/Onset, Context/Setting, Quality, Duration, Modifying Factors, Severity.)   Angelica Harvey is a 74 y.o. female who presents to the emergency department.   C/o painful rash under her left breast. It wraps around to her left flank. Onset was yesterday. Denies fever, chills, SOB, weakness. Rates her pain 10/10. She is accompanied by family.     Nursing Notes were reviewed.    ALLERGIES     Patient has no known allergies.    CURRENT MEDICATIONS       Previous Medications    ASPIRIN 81 MG CHEWABLE TABLET    Take 1 tablet by mouth daily    ATORVASTATIN (LIPITOR) 80 MG TABLET    Take 1 tablet by mouth daily    DAPAGLIFLOZIN (FARXIGA) 10 MG TABLET    Take 1 tablet by mouth daily    ELASTIC BANDAGES & SUPPORTS (JOBST KNEE HIGH COMPRESSION SM) MISC    1 each by Does not apply route daily    METOPROLOL SUCCINATE (TOPROL XL) 50 MG EXTENDED RELEASE TABLET    Take 1 tablet by mouth daily    SACUBITRIL-VALSARTAN (ENTRESTO)  MG PER TABLET    Take 1 tablet by mouth 2 times daily    SPIRONOLACTONE (ALDACTONE) 25 MG TABLET    Take 1 tablet by mouth daily       PAST MEDICAL HISTORY         Diagnosis Date    Arthritis     Brain aneurysm 1998,2014    CVA (cerebral vascular accident) (HCC)     Hypertension     MI (myocardial infarction) (HCC)        SURGICAL HISTORY           Procedure Laterality Date    BRAIN SURGERY  01/2024    clip and coil     CYSTOSCOPY N/A 4/24/2024    CYSTOSCOPY performed by Aleksandr Melendez MD at [facility] OR    JOINT REPLACEMENT      TUBAL LIGATION           FAMILY HISTORY           Problem Relation Age of Onset    Dementia Mother

## 2024-09-24 ENCOUNTER — HOSPITAL ENCOUNTER (EMERGENCY)
Age: 74
Discharge: HOME | End: 2024-09-24
Attending: EMERGENCY MEDICINE
Payer: MEDICARE

## 2024-09-24 VITALS
TEMPERATURE: 98 F | HEART RATE: 87 BPM | WEIGHT: 140 LBS | SYSTOLIC BLOOD PRESSURE: 184 MMHG | DIASTOLIC BLOOD PRESSURE: 106 MMHG | HEIGHT: 69 IN | OXYGEN SATURATION: 98 % | BODY MASS INDEX: 20.73 KG/M2 | RESPIRATION RATE: 18 BRPM

## 2024-09-24 DIAGNOSIS — B02.9 HERPES ZOSTER WITHOUT COMPLICATION: Primary | ICD-10-CM

## 2024-09-24 PROCEDURE — 6370000000 HC RX 637 (ALT 250 FOR IP)

## 2024-09-24 PROCEDURE — 99283 EMERGENCY DEPT VISIT LOW MDM: CPT

## 2024-09-24 RX ORDER — HYDROCODONE BITARTRATE AND ACETAMINOPHEN 5; 325 MG/1; MG/1
1 TABLET ORAL EVERY 6 HOURS PRN
Qty: 18 TABLET | Refills: 0 | Status: SHIPPED | OUTPATIENT
Start: 2024-09-24 | End: 2024-09-29

## 2024-09-24 RX ORDER — LIDOCAINE 4 G/G
1 PATCH TOPICAL ONCE
Status: DISCONTINUED | OUTPATIENT
Start: 2024-09-24 | End: 2024-09-24 | Stop reason: HOSPADM

## 2024-09-24 ASSESSMENT — PAIN DESCRIPTION - ORIENTATION: ORIENTATION: LEFT

## 2024-09-24 ASSESSMENT — PAIN DESCRIPTION - FREQUENCY: FREQUENCY: CONTINUOUS

## 2024-09-24 ASSESSMENT — PAIN SCALES - GENERAL: PAINLEVEL_OUTOF10: 10

## 2024-09-24 ASSESSMENT — PAIN DESCRIPTION - PAIN TYPE: TYPE: ACUTE PAIN

## 2024-09-24 ASSESSMENT — PAIN - FUNCTIONAL ASSESSMENT: PAIN_FUNCTIONAL_ASSESSMENT: 0-10

## 2025-03-21 ENCOUNTER — HOSPITAL ENCOUNTER (EMERGENCY)
Age: 75
Discharge: HOME | End: 2025-03-21
Payer: MEDICARE

## 2025-03-21 VITALS
HEART RATE: 77 BPM | WEIGHT: 135 LBS | BODY MASS INDEX: 19.94 KG/M2 | TEMPERATURE: 97.7 F | RESPIRATION RATE: 16 BRPM | SYSTOLIC BLOOD PRESSURE: 167 MMHG | DIASTOLIC BLOOD PRESSURE: 99 MMHG | OXYGEN SATURATION: 99 %

## 2025-03-21 DIAGNOSIS — R31.9 URINARY TRACT INFECTION WITH HEMATURIA, SITE UNSPECIFIED: Primary | ICD-10-CM

## 2025-03-21 DIAGNOSIS — N39.0 URINARY TRACT INFECTION WITH HEMATURIA, SITE UNSPECIFIED: Primary | ICD-10-CM

## 2025-03-21 LAB
ANION GAP SERPL CALCULATED.3IONS-SCNC: 10 MMOL/L (ref 9–16)
BACTERIA URNS QL MICRO: ABNORMAL
BASOPHILS # BLD: 0.03 K/UL (ref 0–0.2)
BASOPHILS NFR BLD: 0 % (ref 0–2)
BILIRUB UR QL STRIP: NEGATIVE
BUN SERPL-MCNC: 38 MG/DL (ref 8–23)
CALCIUM SERPL-MCNC: 9 MG/DL (ref 8.8–10.2)
CHLORIDE SERPL-SCNC: 107 MMOL/L (ref 98–107)
CLARITY UR: ABNORMAL
CO2 SERPL-SCNC: 21 MMOL/L (ref 20–31)
COLOR UR: YELLOW
CREAT SERPL-MCNC: 1.3 MG/DL (ref 0.5–0.9)
EOSINOPHIL # BLD: 0.16 K/UL (ref 0–0.44)
EOSINOPHILS RELATIVE PERCENT: 2 % (ref 1–4)
EPI CELLS #/AREA URNS HPF: ABNORMAL /HPF (ref 0–5)
ERYTHROCYTE [DISTWIDTH] IN BLOOD BY AUTOMATED COUNT: 14.9 % (ref 11.8–14.4)
GFR, ESTIMATED: 45 ML/MIN/1.73M2
GLUCOSE SERPL-MCNC: 90 MG/DL (ref 82–115)
GLUCOSE UR STRIP-MCNC: ABNORMAL MG/DL
HCT VFR BLD AUTO: 35.1 % (ref 36.3–47.1)
HGB BLD-MCNC: 11.3 G/DL (ref 11.9–15.1)
HGB UR QL STRIP.AUTO: ABNORMAL
IMM GRANULOCYTES # BLD AUTO: 0.02 K/UL (ref 0–0.3)
IMM GRANULOCYTES NFR BLD: 0 %
KETONES UR STRIP-MCNC: NEGATIVE MG/DL
LEUKOCYTE ESTERASE UR QL STRIP: ABNORMAL
LYMPHOCYTES NFR BLD: 1.56 K/UL (ref 1.1–3.7)
LYMPHOCYTES RELATIVE PERCENT: 16 % (ref 24–43)
MCH RBC QN AUTO: 28.3 PG (ref 25.2–33.5)
MCHC RBC AUTO-ENTMCNC: 32.2 G/DL (ref 28.4–34.8)
MCV RBC AUTO: 88 FL (ref 82.6–102.9)
MONOCYTES NFR BLD: 1.17 K/UL (ref 0.1–1.2)
MONOCYTES NFR BLD: 12 % (ref 3–12)
NEUTROPHILS NFR BLD: 70 % (ref 36–65)
NEUTS SEG NFR BLD: 6.61 K/UL (ref 1.5–8.1)
NITRITE UR QL STRIP: POSITIVE
NRBC BLD-RTO: 0 PER 100 WBC
PH UR STRIP: 6 [PH] (ref 5–8)
PLATELET # BLD AUTO: 232 K/UL (ref 138–453)
PMV BLD AUTO: 8.9 FL (ref 8.1–13.5)
POTASSIUM SERPL-SCNC: 4.9 MMOL/L (ref 3.7–5.3)
PROT UR STRIP-MCNC: ABNORMAL MG/DL
RBC # BLD AUTO: 3.99 M/UL (ref 3.95–5.11)
RBC # BLD: ABNORMAL 10*6/UL
RBC #/AREA URNS HPF: ABNORMAL /HPF (ref 0–2)
SODIUM SERPL-SCNC: 137 MMOL/L (ref 136–145)
SP GR UR STRIP: 1.02 (ref 1–1.03)
UROBILINOGEN UR STRIP-ACNC: NORMAL EU/DL (ref 0–1)
WBC #/AREA URNS HPF: ABNORMAL /HPF (ref 0–5)
WBC OTHER # BLD: 9.6 K/UL (ref 3.5–11.3)
YEAST URNS QL MICRO: ABNORMAL

## 2025-03-21 PROCEDURE — 96374 THER/PROPH/DIAG INJ IV PUSH: CPT

## 2025-03-21 PROCEDURE — 2580000003 HC RX 258: Performed by: NURSE PRACTITIONER

## 2025-03-21 PROCEDURE — 87086 URINE CULTURE/COLONY COUNT: CPT

## 2025-03-21 PROCEDURE — 85025 COMPLETE CBC W/AUTO DIFF WBC: CPT

## 2025-03-21 PROCEDURE — 6370000000 HC RX 637 (ALT 250 FOR IP): Performed by: NURSE PRACTITIONER

## 2025-03-21 PROCEDURE — 99284 EMERGENCY DEPT VISIT MOD MDM: CPT

## 2025-03-21 PROCEDURE — 2500000003 HC RX 250 WO HCPCS: Performed by: NURSE PRACTITIONER

## 2025-03-21 PROCEDURE — 87077 CULTURE AEROBIC IDENTIFY: CPT

## 2025-03-21 PROCEDURE — 81001 URINALYSIS AUTO W/SCOPE: CPT

## 2025-03-21 PROCEDURE — 80048 BASIC METABOLIC PNL TOTAL CA: CPT

## 2025-03-21 PROCEDURE — 6360000002 HC RX W HCPCS: Performed by: NURSE PRACTITIONER

## 2025-03-21 PROCEDURE — 87186 SC STD MICRODIL/AGAR DIL: CPT

## 2025-03-21 RX ORDER — FLUCONAZOLE 100 MG/1
100 TABLET ORAL EVERY OTHER DAY
Qty: 3 TABLET | Refills: 0 | Status: SHIPPED | OUTPATIENT
Start: 2025-03-23 | End: 2025-03-28

## 2025-03-21 RX ORDER — 0.9 % SODIUM CHLORIDE 0.9 %
1000 INTRAVENOUS SOLUTION INTRAVENOUS ONCE
Status: COMPLETED | OUTPATIENT
Start: 2025-03-21 | End: 2025-03-21

## 2025-03-21 RX ORDER — FLUCONAZOLE 150 MG/1
150 TABLET ORAL ONCE
Status: COMPLETED | OUTPATIENT
Start: 2025-03-21 | End: 2025-03-21

## 2025-03-21 RX ORDER — CEPHALEXIN 500 MG/1
500 CAPSULE ORAL 3 TIMES DAILY
Qty: 21 CAPSULE | Refills: 0 | Status: SHIPPED | OUTPATIENT
Start: 2025-03-21 | End: 2025-03-28

## 2025-03-21 RX ADMIN — WATER 1000 MG: 1 INJECTION INTRAMUSCULAR; INTRAVENOUS; SUBCUTANEOUS at 14:42

## 2025-03-21 RX ADMIN — FLUCONAZOLE 150 MG: 150 TABLET ORAL at 14:41

## 2025-03-21 RX ADMIN — SODIUM CHLORIDE 1000 ML: 0.9 INJECTION, SOLUTION INTRAVENOUS at 14:42

## 2025-03-21 ASSESSMENT — ENCOUNTER SYMPTOMS
VOMITING: 0
NAUSEA: 0
COUGH: 0
COLOR CHANGE: 0
DIARRHEA: 0
ABDOMINAL PAIN: 0
SHORTNESS OF BREATH: 0
BACK PAIN: 1

## 2025-03-21 ASSESSMENT — LIFESTYLE VARIABLES: HOW MANY STANDARD DRINKS CONTAINING ALCOHOL DO YOU HAVE ON A TYPICAL DAY: PATIENT DOES NOT DRINK

## 2025-03-21 ASSESSMENT — PAIN DESCRIPTION - LOCATION: LOCATION: BACK

## 2025-03-21 ASSESSMENT — PAIN SCALES - GENERAL: PAINLEVEL_OUTOF10: 8

## 2025-03-21 ASSESSMENT — PAIN DESCRIPTION - DESCRIPTORS: DESCRIPTORS: SHARP

## 2025-03-21 ASSESSMENT — PAIN DESCRIPTION - FREQUENCY: FREQUENCY: CONTINUOUS

## 2025-03-21 ASSESSMENT — PAIN - FUNCTIONAL ASSESSMENT: PAIN_FUNCTIONAL_ASSESSMENT: 0-10

## 2025-03-21 NOTE — ED NOTES
Pt presents to ER from home due to Urine Frequency.Pt states S/S started 1 week ago. S/S include dysuria, Blood in her urine. Pt denies chest pain, SOB, Nausea or vomiting. Pt's urine is pale in color with odor noted.Pt states back pain but denies injury or trauma.Pt is A/O x 4, equal chest expansion with non labored breathing, wheels locked, bed in lowest position, call light in reach.

## 2025-03-21 NOTE — ED PROVIDER NOTES
[facility] EMERGENCY DEPARTMENT  eMERGENCY dEPARTMENT eNCOUnter      Pt Name: Angelica Harvey  MRN: [id number]  Birthdate 1950  Date of evaluation: 3/21/2025  Provider: TOBI Aguilar CNP    CHIEF COMPLAINT       Chief Complaint   Patient presents with    Urinary Frequency         HISTORY OF PRESENT ILLNESS  (Location/Symptom, Timing/Onset, Context/Setting, Quality, Duration, Modifying Factors, Severity.)   Angelica Harvey is a 74 y.o. female who presents to the emergency department. C/o dysuria, hematuria, urinary frequency/urgency. Onset was 2 weeks ago. She is now having bilateral flank pain. Denies fever, chills, abd pain, N/V/D. Rates her pain 8/10.      Nursing Notes were reviewed.    ALLERGIES     Patient has no known allergies.    CURRENT MEDICATIONS       Previous Medications    ASPIRIN 81 MG CHEWABLE TABLET    Take 1 tablet by mouth daily    ATORVASTATIN (LIPITOR) 80 MG TABLET    Take 1 tablet by mouth daily    DAPAGLIFLOZIN (FARXIGA) 10 MG TABLET    Take 1 tablet by mouth daily    ELASTIC BANDAGES & SUPPORTS (JOBST KNEE HIGH COMPRESSION SM) MISC    1 each by Does not apply route daily    METOPROLOL SUCCINATE (TOPROL XL) 50 MG EXTENDED RELEASE TABLET    Take 1 tablet by mouth daily    SACUBITRIL-VALSARTAN (ENTRESTO)  MG PER TABLET    Take 1 tablet by mouth 2 times daily    SPIRONOLACTONE (ALDACTONE) 25 MG TABLET    Take 1 tablet by mouth daily       PAST MEDICAL HISTORY         Diagnosis Date    Arthritis     Brain aneurysm 1998,2014    CVA (cerebral vascular accident) (HCC)     Hypertension     MI (myocardial infarction) (HCC)        SURGICAL HISTORY           Procedure Laterality Date    BRAIN SURGERY  01/2024    clip and coil     CYSTOSCOPY N/A 4/24/2024    CYSTOSCOPY performed by Aleksandr Melendez MD at [facility] OR    JOINT REPLACEMENT      TUBAL LIGATION           FAMILY HISTORY           Problem Relation Age of Onset    Dementia Mother     Heart Attack Mother 50     Family Status     Gross hematuria R31.0    Urinary retention R33.9    Bilateral hydronephrosis N13.30    Hx of TIA (transient ischemic attack) and stroke Z86.73    Hx of cardiac arrest Z86.74    Acute pyelonephritis N10    Chest pain R07.9    Light headedness R42         DISPOSITION/PLAN   DISPOSITION Decision To Discharge 03/21/2025 03:33:05 PM   DISPOSITION CONDITION Stable           PATIENT REFERRED TO:   Ciro Burns PA-C  [address]  [facility] [address]  [phone]    Schedule an appointment as soon as possible for a visit       [facility] Emergency Department  [address] [place] [address]  [phone]    If symptoms worsen, As needed      DISCHARGE MEDICATIONS:     New Prescriptions    CEPHALEXIN (KEFLEX) 500 MG CAPSULE    Take 1 capsule by mouth 3 times daily for 7 days           (Please note that portions of this note were completed with a voice recognition program.  Efforts were made to edit the dictations but occasionally words are mis-transcribed.)    TOBI Aguilar - Marilou Murphy APRN - CNP  03/21/25 1542

## 2025-03-23 LAB
MICROORGANISM SPEC CULT: ABNORMAL
SERVICE CMNT-IMP: ABNORMAL
SPECIMEN DESCRIPTION: ABNORMAL

## (undated) DEVICE — GLOVE SURG SZ 7 CRM LTX FREE POLYISOPRENE POLYMER BEAD ANTI

## (undated) DEVICE — MARKER,SKIN,WI/RULER AND LABELS: Brand: MEDLINE

## (undated) DEVICE — SOLUTION IRRIGATION STRL H2O 1000 ML UROMATIC CONTAINER

## (undated) DEVICE — STAZ CYSTO: Brand: MEDLINE INDUSTRIES, INC.